# Patient Record
Sex: MALE | Race: WHITE | Employment: OTHER | URBAN - NONMETROPOLITAN AREA
[De-identification: names, ages, dates, MRNs, and addresses within clinical notes are randomized per-mention and may not be internally consistent; named-entity substitution may affect disease eponyms.]

---

## 2017-01-01 ENCOUNTER — OFFICE VISIT (OUTPATIENT)
Dept: FAMILY MEDICINE CLINIC | Age: 77
End: 2017-01-01
Payer: MEDICARE

## 2017-01-01 VITALS
SYSTOLIC BLOOD PRESSURE: 122 MMHG | HEART RATE: 60 BPM | OXYGEN SATURATION: 97 % | WEIGHT: 171 LBS | BODY MASS INDEX: 33.57 KG/M2 | DIASTOLIC BLOOD PRESSURE: 62 MMHG | HEIGHT: 60 IN | RESPIRATION RATE: 14 BRPM | TEMPERATURE: 97.4 F

## 2017-01-01 DIAGNOSIS — R60.0 BILATERAL LOWER EXTREMITY EDEMA: Primary | ICD-10-CM

## 2017-01-01 DIAGNOSIS — I50.9 DYSPNEA DUE TO CONGESTIVE HEART FAILURE (HCC): ICD-10-CM

## 2017-01-01 PROCEDURE — G0444 DEPRESSION SCREEN ANNUAL: HCPCS | Performed by: NURSE PRACTITIONER

## 2017-01-01 PROCEDURE — 99213 OFFICE O/P EST LOW 20 MIN: CPT | Performed by: NURSE PRACTITIONER

## 2017-01-01 ASSESSMENT — PATIENT HEALTH QUESTIONNAIRE - PHQ9
SUM OF ALL RESPONSES TO PHQ QUESTIONS 1-9: 11
5. POOR APPETITE OR OVEREATING: 2
7. TROUBLE CONCENTRATING ON THINGS, SUCH AS READING THE NEWSPAPER OR WATCHING TELEVISION: 0
8. MOVING OR SPEAKING SO SLOWLY THAT OTHER PEOPLE COULD HAVE NOTICED. OR THE OPPOSITE, BEING SO FIGETY OR RESTLESS THAT YOU HAVE BEEN MOVING AROUND A LOT MORE THAN USUAL: 0
9. THOUGHTS THAT YOU WOULD BE BETTER OFF DEAD, OR OF HURTING YOURSELF: 0
3. TROUBLE FALLING OR STAYING ASLEEP: 0
2. FEELING DOWN, DEPRESSED OR HOPELESS: 3
1. LITTLE INTEREST OR PLEASURE IN DOING THINGS: 3
10. IF YOU CHECKED OFF ANY PROBLEMS, HOW DIFFICULT HAVE THESE PROBLEMS MADE IT FOR YOU TO DO YOUR WORK, TAKE CARE OF THINGS AT HOME, OR GET ALONG WITH OTHER PEOPLE: 2
4. FEELING TIRED OR HAVING LITTLE ENERGY: 3
6. FEELING BAD ABOUT YOURSELF - OR THAT YOU ARE A FAILURE OR HAVE LET YOURSELF OR YOUR FAMILY DOWN: 0
SUM OF ALL RESPONSES TO PHQ9 QUESTIONS 1 & 2: 6

## 2017-01-01 ASSESSMENT — ENCOUNTER SYMPTOMS
TROUBLE SWALLOWING: 0
CHEST TIGHTNESS: 0
SORE THROAT: 0
COLOR CHANGE: 0
COUGH: 0
SHORTNESS OF BREATH: 1
WHEEZING: 0
RHINORRHEA: 0

## 2017-02-24 DIAGNOSIS — C61 MALIGNANT NEOPLASM OF PROSTATE (HCC): Primary | ICD-10-CM

## 2017-02-24 LAB — PROSTATE SPECIFIC ANTIGEN: 5.63 NG/ML

## 2017-02-27 ENCOUNTER — OFFICE VISIT (OUTPATIENT)
Dept: UROLOGY | Age: 77
End: 2017-02-27

## 2017-02-27 VITALS
HEIGHT: 64 IN | DIASTOLIC BLOOD PRESSURE: 58 MMHG | WEIGHT: 162 LBS | BODY MASS INDEX: 27.66 KG/M2 | SYSTOLIC BLOOD PRESSURE: 122 MMHG

## 2017-02-27 DIAGNOSIS — C61 PROSTATE CANCER (HCC): Primary | ICD-10-CM

## 2017-02-27 LAB
BILIRUBIN URINE: NEGATIVE
BLOOD URINE, POC: NEGATIVE
CHARACTER, URINE: CLEAR
COLOR, URINE: YELLOW
GLUCOSE URINE: NEGATIVE MG/DL
KETONES, URINE: NEGATIVE
LEUKOCYTE CLUMPS, URINE: NEGATIVE
NITRITE, URINE: NEGATIVE
PH, URINE: 5.5
PROTEIN, URINE: NEGATIVE MG/DL
SPECIFIC GRAVITY, URINE: 1.01 (ref 1–1.03)
UROBILINOGEN, URINE: 0.2 EU/DL

## 2017-02-27 PROCEDURE — 99214 OFFICE O/P EST MOD 30 MIN: CPT | Performed by: UROLOGY

## 2017-02-27 PROCEDURE — G8484 FLU IMMUNIZE NO ADMIN: HCPCS | Performed by: UROLOGY

## 2017-02-27 PROCEDURE — G8598 ASA/ANTIPLAT THER USED: HCPCS | Performed by: UROLOGY

## 2017-02-27 PROCEDURE — G8427 DOCREV CUR MEDS BY ELIG CLIN: HCPCS | Performed by: UROLOGY

## 2017-02-27 PROCEDURE — 1123F ACP DISCUSS/DSCN MKR DOCD: CPT | Performed by: UROLOGY

## 2017-02-27 PROCEDURE — 96402 CHEMO HORMON ANTINEOPL SQ/IM: CPT | Performed by: UROLOGY

## 2017-02-27 PROCEDURE — 1036F TOBACCO NON-USER: CPT | Performed by: UROLOGY

## 2017-02-27 PROCEDURE — 81003 URINALYSIS AUTO W/O SCOPE: CPT | Performed by: UROLOGY

## 2017-02-27 PROCEDURE — 4040F PNEUMOC VAC/ADMIN/RCVD: CPT | Performed by: UROLOGY

## 2017-02-27 PROCEDURE — G8420 CALC BMI NORM PARAMETERS: HCPCS | Performed by: UROLOGY

## 2017-02-27 RX ORDER — SPIRONOLACTONE 25 MG/1
12.5 TABLET ORAL DAILY
COMMUNITY

## 2017-02-27 RX ORDER — FOLIC ACID 1 MG/1
1 TABLET ORAL DAILY
COMMUNITY
End: 2018-01-01 | Stop reason: ALTCHOICE

## 2017-02-27 ASSESSMENT — ENCOUNTER SYMPTOMS
FACIAL SWELLING: 0
CONSTIPATION: 0
SHORTNESS OF BREATH: 0
EYE REDNESS: 0
BACK PAIN: 1
EYE PAIN: 0
VOMITING: 0
CHEST TIGHTNESS: 0
COLOR CHANGE: 0

## 2017-04-06 DIAGNOSIS — C61 PROSTATE CANCER (HCC): ICD-10-CM

## 2017-04-28 LAB — PROSTATE SPECIFIC ANTIGEN: 8.1 NG/ML

## 2017-05-30 LAB — PROSTATE SPECIFIC ANTIGEN: 12.78 NG/ML

## 2017-06-22 ENCOUNTER — OFFICE VISIT (OUTPATIENT)
Dept: UROLOGY | Age: 77
End: 2017-06-22

## 2017-06-22 VITALS — HEIGHT: 65 IN | WEIGHT: 172 LBS | BODY MASS INDEX: 28.66 KG/M2

## 2017-06-22 DIAGNOSIS — C61 PROSTATE CANCER (HCC): Primary | ICD-10-CM

## 2017-06-22 DIAGNOSIS — R97.20 ELEVATED PSA: ICD-10-CM

## 2017-06-22 LAB
BILIRUBIN URINE: NORMAL
BLOOD URINE, POC: NORMAL
CHARACTER, URINE: CLEAR
COLOR, URINE: YELLOW
GLUCOSE URINE: NEGATIVE MG/DL
KETONES, URINE: NEGATIVE
LEUKOCYTE CLUMPS, URINE: NEGATIVE
NITRITE, URINE: NEGATIVE
PH, URINE: 5.5
PROTEIN, URINE: NEGATIVE MG/DL
SPECIFIC GRAVITY, URINE: 1.02 (ref 1–1.03)
UROBILINOGEN, URINE: 0.2 EU/DL

## 2017-06-22 PROCEDURE — 1123F ACP DISCUSS/DSCN MKR DOCD: CPT | Performed by: UROLOGY

## 2017-06-22 PROCEDURE — G8419 CALC BMI OUT NRM PARAM NOF/U: HCPCS | Performed by: UROLOGY

## 2017-06-22 PROCEDURE — 1036F TOBACCO NON-USER: CPT | Performed by: UROLOGY

## 2017-06-22 PROCEDURE — 81003 URINALYSIS AUTO W/O SCOPE: CPT | Performed by: UROLOGY

## 2017-06-22 PROCEDURE — 96402 CHEMO HORMON ANTINEOPL SQ/IM: CPT | Performed by: UROLOGY

## 2017-06-22 PROCEDURE — G8598 ASA/ANTIPLAT THER USED: HCPCS | Performed by: UROLOGY

## 2017-06-22 PROCEDURE — G8427 DOCREV CUR MEDS BY ELIG CLIN: HCPCS | Performed by: UROLOGY

## 2017-06-22 PROCEDURE — 4040F PNEUMOC VAC/ADMIN/RCVD: CPT | Performed by: UROLOGY

## 2017-06-22 PROCEDURE — 99215 OFFICE O/P EST HI 40 MIN: CPT | Performed by: UROLOGY

## 2017-06-22 RX ORDER — BICALUTAMIDE 50 MG/1
50 TABLET, FILM COATED ORAL DAILY
Qty: 90 TABLET | Refills: 2 | Status: SHIPPED | OUTPATIENT
Start: 2017-06-22 | End: 2018-01-01 | Stop reason: ALTCHOICE

## 2017-06-22 ASSESSMENT — ENCOUNTER SYMPTOMS
NAUSEA: 0
CHEST TIGHTNESS: 0
ABDOMINAL PAIN: 0
EYE REDNESS: 0
SHORTNESS OF BREATH: 0
COLOR CHANGE: 0
EYE PAIN: 0
BACK PAIN: 1

## 2017-07-05 ENCOUNTER — TELEPHONE (OUTPATIENT)
Dept: UROLOGY | Age: 77
End: 2017-07-05

## 2017-07-31 LAB
INR BLD: 1.1
PROTIME: 10.9 SECONDS

## 2017-08-30 LAB — PROSTATE SPECIFIC ANTIGEN: 4.79 NG/ML

## 2017-09-05 DIAGNOSIS — C61 PROSTATE CANCER (HCC): ICD-10-CM

## 2017-09-28 ENCOUNTER — OFFICE VISIT (OUTPATIENT)
Dept: UROLOGY | Age: 77
End: 2017-09-28
Payer: MEDICARE

## 2017-09-28 VITALS
DIASTOLIC BLOOD PRESSURE: 72 MMHG | SYSTOLIC BLOOD PRESSURE: 130 MMHG | HEIGHT: 65 IN | WEIGHT: 168 LBS | BODY MASS INDEX: 27.99 KG/M2

## 2017-09-28 DIAGNOSIS — C61 PROSTATE CANCER (HCC): Primary | ICD-10-CM

## 2017-09-28 LAB
BILIRUBIN URINE: NEGATIVE
BLOOD URINE, POC: NEGATIVE
CHARACTER, URINE: CLEAR
COLOR, URINE: YELLOW
GLUCOSE URINE: NEGATIVE MG/DL
KETONES, URINE: NEGATIVE
LEUKOCYTE CLUMPS, URINE: NEGATIVE
NITRITE, URINE: NEGATIVE
PH, URINE: 7
PROTEIN, URINE: NEGATIVE MG/DL
SPECIFIC GRAVITY, URINE: 1.01 (ref 1–1.03)
UROBILINOGEN, URINE: 0.2 EU/DL

## 2017-09-28 PROCEDURE — G8428 CUR MEDS NOT DOCUMENT: HCPCS | Performed by: UROLOGY

## 2017-09-28 PROCEDURE — 4040F PNEUMOC VAC/ADMIN/RCVD: CPT | Performed by: UROLOGY

## 2017-09-28 PROCEDURE — 1036F TOBACCO NON-USER: CPT | Performed by: UROLOGY

## 2017-09-28 PROCEDURE — G8417 CALC BMI ABV UP PARAM F/U: HCPCS | Performed by: UROLOGY

## 2017-09-28 PROCEDURE — 99215 OFFICE O/P EST HI 40 MIN: CPT | Performed by: UROLOGY

## 2017-09-28 PROCEDURE — 96402 CHEMO HORMON ANTINEOPL SQ/IM: CPT | Performed by: UROLOGY

## 2017-09-28 PROCEDURE — G8598 ASA/ANTIPLAT THER USED: HCPCS | Performed by: UROLOGY

## 2017-09-28 PROCEDURE — 81003 URINALYSIS AUTO W/O SCOPE: CPT | Performed by: UROLOGY

## 2017-09-28 PROCEDURE — 1123F ACP DISCUSS/DSCN MKR DOCD: CPT | Performed by: UROLOGY

## 2017-09-28 ASSESSMENT — ENCOUNTER SYMPTOMS
VOMITING: 0
CHEST TIGHTNESS: 0
SHORTNESS OF BREATH: 0
DIARRHEA: 0

## 2017-10-02 ENCOUNTER — TELEPHONE (OUTPATIENT)
Dept: UROLOGY | Age: 77
End: 2017-10-02

## 2017-10-03 NOTE — TELEPHONE ENCOUNTER
Patient was appreciative of the phone call back and stated that takes a load of his mind. He was really bothered by this. Thank you.

## 2017-12-13 NOTE — PATIENT INSTRUCTIONS
Patient Education      Go directly to ER. Heart Failure: Care Instructions  Your Care Instructions    Heart failure occurs when your heart does not pump as much blood as the body needs. Failure does not mean that the heart has stopped pumping but rather that it is not pumping as well as it should. Over time, this causes fluid buildup in your lungs and other parts of your body. Fluid buildup can cause shortness of breath, fatigue, swollen ankles, and other problems. By taking medicines regularly, reducing sodium (salt) in your diet, checking your weight every day, and making lifestyle changes, you can feel better and live longer. Follow-up care is a key part of your treatment and safety. Be sure to make and go to all appointments, and call your doctor if you are having problems. It's also a good idea to know your test results and keep a list of the medicines you take. How can you care for yourself at home? Medicines  ? · Be safe with medicines. Take your medicines exactly as prescribed. Call your doctor if you think you are having a problem with your medicine. ? · Do not take any vitamins, over-the-counter medicine, or herbal products without talking to your doctor first. Yanet Sanchezmb not take ibuprofen (Advil or Motrin) and naproxen (Aleve) without talking to your doctor first. They could make your heart failure worse. ? · You may be taking some of the following medicine. ¨ Beta-blockers can slow heart rate, decrease blood pressure, and improve your condition. Taking a beta-blocker may lower your chance of needing to be hospitalized. ¨ Angiotensin-converting enzyme inhibitors (ACEIs) reduce the heart's workload, lower blood pressure, and reduce swelling. Taking an ACEI may lower your chance of needing to be hospitalized again. ¨ Angiotensin II receptor blockers (ARBs) work like ACEIs. Your doctor may prescribe them instead of ACEIs. ¨ Diuretics, also called water pills, reduce swelling.   ¨ Potassium supplements replace this important mineral, which is sometimes lost with diuretics. ¨ Aspirin and other blood thinners prevent blood clots, which can cause a stroke or heart attack. ? You will get more details on the specific medicines your doctor prescribes. Diet  ? · Your doctor may suggest that you limit sodium to 2,000 milligrams (mg) a day or less. That is less than 1 teaspoon of salt a day, including all the salt you eat in cooking or in packaged foods. People get most of their sodium from processed foods. Fast food and restaurant meals also tend to be very high in sodium. ? · Ask your doctor how much liquid you can drink each day. You may have to limit liquids. ?Weight  ? · Weigh yourself without clothing at the same time each day. Record your weight. Call your doctor if you have a sudden weight gain, such as more than 2 to 3 pounds in a day or 5 pounds in a week. (Your doctor may suggest a different range of weight gain.) A sudden weight gain may mean that your heart failure is getting worse. ? Activity level  ? · Start light exercise (if your doctor says it is okay). Even if you can only do a small amount, exercise will help you get stronger, have more energy, and manage your weight and your stress. Walking is an easy way to get exercise. Start out by walking a little more than you did before. Bit by bit, increase the amount you walk. ? · When you exercise, watch for signs that your heart is working too hard. You are pushing yourself too hard if you cannot talk while you are exercising. If you become short of breath or dizzy or have chest pain, stop, sit down, and rest.   ? · If you feel \"wiped out\" the day after you exercise, walk slower or for a shorter distance until you can work up to a better pace. ? · Get enough rest at night. Sleeping with 1 or 2 pillows under your upper body and head may help you breathe easier. ? Lifestyle changes  ? · Do not smoke. Smoking can make a heart condition worse.  If you

## 2017-12-13 NOTE — PROGRESS NOTES
in the right upper field and the left upper field. He has no wheezes. He has no rhonchi. He has no rales. Musculoskeletal:        Right lower leg: He exhibits edema (2+, pitting). Left lower leg: He exhibits edema (2+, pitting. Slight erythema anterior left lower leg.). Lymphadenopathy:        Head (right side): No submental, no submandibular, no tonsillar, no preauricular, no posterior auricular and no occipital adenopathy present. Head (left side): No submental, no submandibular, no tonsillar, no preauricular, no posterior auricular and no occipital adenopathy present. He has no cervical adenopathy. Neurological: He is alert and oriented to person, place, and time. He is not disoriented. Skin: Skin is warm, dry and intact. No rash noted. He is not diaphoretic. There is erythema (minimal, left anterior lower leg). No pallor. Skin intact, warm and dry to touch, no rashes noted. Nursing note and vitals reviewed. DIAGNOSTIC RESULTS   Labs:No results found for this visit on 12/13/17. IMAGING:  No orders to display     CLINICAL COURSE COURSE:     Vitals:    12/13/17 1624   BP: 122/62   Pulse: 60   Resp: 14   Temp: 97.4 °F (36.3 °C)   SpO2: 97%   Weight: 171 lb (77.6 kg)   Height: 5' 0.24\" (1.53 m)         PROCEDURES:  None  FINAL IMPRESSION      1. Bilateral lower extremity edema    2. Dyspnea due to congestive heart failure (HCC)         DISPOSITION/PLAN     Non-toxic, no acute distress. Slightly diminished lung sounds bilateral upper lobes. No adventitious sounds. Bilateral lower extremity pitting edema, 2+. Denies chest pain. Patient going by private car by his daughter to [facility] ER in stable condition. Advised to go directly to ER. Report called to Dr. Elieser Cortez. Discharge instructions given by staff. PATIENT REFERRED TO:    Patient going directly to [facility] ER.     DISCHARGE MEDICATIONS:  New Prescriptions    No medications on file         Electronically signed by Melissa Valdez

## 2018-01-01 ENCOUNTER — TELEPHONE (OUTPATIENT)
Dept: UROLOGY | Age: 78
End: 2018-01-01

## 2018-01-01 ENCOUNTER — OFFICE VISIT (OUTPATIENT)
Dept: UROLOGY | Age: 78
End: 2018-01-01
Payer: MEDICARE

## 2018-01-01 ENCOUNTER — HOSPITAL ENCOUNTER (INPATIENT)
Age: 78
LOS: 5 days | Discharge: HOSPICE | DRG: 947 | End: 2018-10-30
Attending: HOSPITALIST | Admitting: HOSPITALIST
Payer: MEDICARE

## 2018-01-01 ENCOUNTER — HOSPITAL ENCOUNTER (OUTPATIENT)
Dept: NUCLEAR MEDICINE | Age: 78
Discharge: HOME | End: 2018-09-17
Payer: MEDICARE

## 2018-01-01 ENCOUNTER — OFFICE VISIT (OUTPATIENT)
Dept: ONCOLOGY | Age: 78
End: 2018-01-01
Payer: MEDICARE

## 2018-01-01 ENCOUNTER — APPOINTMENT (OUTPATIENT)
Dept: GENERAL RADIOLOGY | Age: 78
DRG: 947 | End: 2018-01-01
Payer: MEDICARE

## 2018-01-01 ENCOUNTER — CLINICAL DOCUMENTATION (OUTPATIENT)
Dept: NUTRITION | Age: 78
End: 2018-01-01

## 2018-01-01 ENCOUNTER — NURSE ONLY (OUTPATIENT)
Dept: UROLOGY | Age: 78
End: 2018-01-01
Payer: MEDICARE

## 2018-01-01 ENCOUNTER — HOSPITAL ENCOUNTER (OUTPATIENT)
Dept: RADIATION ONCOLOGY | Age: 78
Discharge: HOME | End: 2018-10-03
Payer: MEDICARE

## 2018-01-01 ENCOUNTER — HOSPITAL ENCOUNTER (OUTPATIENT)
Age: 78
Discharge: HOME | End: 2018-04-13
Payer: MEDICARE

## 2018-01-01 ENCOUNTER — HOSPITAL ENCOUNTER (OUTPATIENT)
Dept: GENERAL RADIOLOGY | Age: 78
Discharge: HOME | End: 2018-04-19
Payer: MEDICARE

## 2018-01-01 ENCOUNTER — HOSPITAL ENCOUNTER (OUTPATIENT)
Dept: CT IMAGING | Age: 78
Discharge: HOME | End: 2018-10-04
Payer: MEDICARE

## 2018-01-01 ENCOUNTER — APPOINTMENT (OUTPATIENT)
Dept: CT IMAGING | Age: 78
DRG: 947 | End: 2018-01-01
Payer: MEDICARE

## 2018-01-01 ENCOUNTER — TELEPHONE (OUTPATIENT)
Dept: INFUSION THERAPY | Age: 78
End: 2018-01-01

## 2018-01-01 ENCOUNTER — NURSE ONLY (OUTPATIENT)
Dept: UROLOGY | Age: 78
End: 2018-01-01

## 2018-01-01 ENCOUNTER — TELEPHONE (OUTPATIENT)
Dept: ONCOLOGY | Age: 78
End: 2018-01-01

## 2018-01-01 ENCOUNTER — HOSPITAL ENCOUNTER (OUTPATIENT)
Dept: CT IMAGING | Age: 78
Discharge: HOME | End: 2018-04-19
Payer: MEDICARE

## 2018-01-01 VITALS
HEIGHT: 63 IN | SYSTOLIC BLOOD PRESSURE: 120 MMHG | WEIGHT: 159 LBS | BODY MASS INDEX: 28.17 KG/M2 | DIASTOLIC BLOOD PRESSURE: 80 MMHG

## 2018-01-01 VITALS
WEIGHT: 150 LBS | BODY MASS INDEX: 26.58 KG/M2 | SYSTOLIC BLOOD PRESSURE: 76 MMHG | DIASTOLIC BLOOD PRESSURE: 42 MMHG | HEIGHT: 63 IN

## 2018-01-01 VITALS
DIASTOLIC BLOOD PRESSURE: 66 MMHG | WEIGHT: 171 LBS | BODY MASS INDEX: 33.57 KG/M2 | SYSTOLIC BLOOD PRESSURE: 126 MMHG | HEIGHT: 60 IN

## 2018-01-01 VITALS
WEIGHT: 150.7 LBS | HEIGHT: 63 IN | RESPIRATION RATE: 18 BRPM | OXYGEN SATURATION: 90 % | SYSTOLIC BLOOD PRESSURE: 114 MMHG | HEART RATE: 70 BPM | TEMPERATURE: 98.6 F | DIASTOLIC BLOOD PRESSURE: 64 MMHG | BODY MASS INDEX: 26.7 KG/M2

## 2018-01-01 VITALS
SYSTOLIC BLOOD PRESSURE: 120 MMHG | BODY MASS INDEX: 31.06 KG/M2 | HEIGHT: 62 IN | WEIGHT: 168.8 LBS | DIASTOLIC BLOOD PRESSURE: 64 MMHG

## 2018-01-01 VITALS
WEIGHT: 156.8 LBS | DIASTOLIC BLOOD PRESSURE: 66 MMHG | BODY MASS INDEX: 27.78 KG/M2 | TEMPERATURE: 97.6 F | HEIGHT: 63 IN | RESPIRATION RATE: 18 BRPM | HEART RATE: 66 BPM | SYSTOLIC BLOOD PRESSURE: 143 MMHG | OXYGEN SATURATION: 93 %

## 2018-01-01 DIAGNOSIS — G89.3 CANCER RELATED PAIN: ICD-10-CM

## 2018-01-01 DIAGNOSIS — C61 PROSTATE CANCER METASTATIC TO BONE (HCC): ICD-10-CM

## 2018-01-01 DIAGNOSIS — C79.51 BONE METASTASIS (HCC): ICD-10-CM

## 2018-01-01 DIAGNOSIS — R33.9 INCOMPLETE EMPTYING OF BLADDER: ICD-10-CM

## 2018-01-01 DIAGNOSIS — I50.9 ACUTE ON CHRONIC CONGESTIVE HEART FAILURE, UNSPECIFIED HEART FAILURE TYPE (HCC): Primary | ICD-10-CM

## 2018-01-01 DIAGNOSIS — C61 PROSTATE CANCER (HCC): Primary | ICD-10-CM

## 2018-01-01 DIAGNOSIS — C61 PROSTATE CANCER METASTATIC TO BONE (HCC): Primary | ICD-10-CM

## 2018-01-01 DIAGNOSIS — C61 PROSTATE CANCER (HCC): ICD-10-CM

## 2018-01-01 DIAGNOSIS — C79.51 SECONDARY MALIGNANT NEOPLASM OF BONE AND BONE MARROW (HCC): ICD-10-CM

## 2018-01-01 DIAGNOSIS — Z00.6 EXAMINATION FOR NORMAL COMPARISON FOR CLINICAL RESEARCH: ICD-10-CM

## 2018-01-01 DIAGNOSIS — C79.51 PROSTATE CANCER METASTATIC TO BONE (HCC): ICD-10-CM

## 2018-01-01 DIAGNOSIS — C79.51 SECONDARY MALIGNANT NEOPLASM OF BONE AND BONE MARROW (HCC): Primary | ICD-10-CM

## 2018-01-01 DIAGNOSIS — C61 MALIGNANT NEOPLASM OF PROSTATE (HCC): Primary | ICD-10-CM

## 2018-01-01 DIAGNOSIS — Z79.818 ANDROGEN DEPRIVATION THERAPY: ICD-10-CM

## 2018-01-01 DIAGNOSIS — C79.52 SECONDARY MALIGNANT NEOPLASM OF BONE AND BONE MARROW (HCC): ICD-10-CM

## 2018-01-01 DIAGNOSIS — R33.9 URINARY RETENTION: ICD-10-CM

## 2018-01-01 DIAGNOSIS — C61 PROSTATE CANCER METASTATIC TO MULTIPLE SITES (HCC): Primary | ICD-10-CM

## 2018-01-01 DIAGNOSIS — C79.51 BONE METASTASES (HCC): ICD-10-CM

## 2018-01-01 DIAGNOSIS — C79.52 SECONDARY MALIGNANT NEOPLASM OF BONE AND BONE MARROW (HCC): Primary | ICD-10-CM

## 2018-01-01 DIAGNOSIS — C79.51 PROSTATE CANCER METASTATIC TO BONE (HCC): Primary | ICD-10-CM

## 2018-01-01 LAB
ADENOVIRUS F 40 41 PCR: NOT DETECTED
ALBUMIN SERPL-MCNC: 2.8 G/DL (ref 3.5–5.1)
ALP BLD-CCNC: 116 U/L (ref 38–126)
ALT SERPL-CCNC: 8 U/L (ref 11–66)
ANION GAP SERPL CALCULATED.3IONS-SCNC: 13 MEQ/L (ref 8–16)
ANION GAP SERPL CALCULATED.3IONS-SCNC: 13 MEQ/L (ref 8–16)
ANION GAP SERPL CALCULATED.3IONS-SCNC: 14 MEQ/L (ref 8–16)
ANION GAP SERPL CALCULATED.3IONS-SCNC: 15 MEQ/L (ref 8–16)
ANION GAP SERPL CALCULATED.3IONS-SCNC: 16 MEQ/L (ref 8–16)
AST SERPL-CCNC: 12 U/L (ref 5–40)
ASTROVIRUS PCR: NOT DETECTED
BASOPHILS # BLD: 0.2 %
BASOPHILS ABSOLUTE: 0 THOU/MM3 (ref 0–0.1)
BASOPHILS ABSOLUTE: 0.1 /ΜL
BASOPHILS RELATIVE PERCENT: 0.9 %
BILIRUB SERPL-MCNC: 0.7 MG/DL (ref 0.3–1.2)
BILIRUBIN DIRECT: < 0.2 MG/DL (ref 0–0.3)
BILIRUBIN URINE: NEGATIVE
BILIRUBIN URINE: NORMAL MG/DL
BLOOD URINE, POC: ABNORMAL
BLOOD URINE, POC: NEGATIVE
BLOOD URINE, POC: NORMAL
BLOOD, URINE: NEGATIVE
BUN BLDV-MCNC: 18 MG/DL (ref 7–22)
BUN BLDV-MCNC: 19 MG/DL (ref 7–22)
BUN BLDV-MCNC: 20 MG/DL (ref 7–22)
BUN BLDV-MCNC: 20 MG/DL (ref 7–22)
BUN BLDV-MCNC: 24 MG/DL (ref 7–22)
BUN BLDV-MCNC: 25 MG/DL
C-REACTIVE PROTEIN: 1.52 MG/DL (ref 0–1)
CALCIUM IONIZED: 0.78 MMOL/L (ref 1.12–1.32)
CALCIUM IONIZED: 0.81 MMOL/L (ref 1.12–1.32)
CALCIUM IONIZED: 0.82 MMOL/L (ref 1.12–1.32)
CALCIUM IONIZED: 0.89 MMOL/L (ref 1.12–1.32)
CALCIUM SERPL-MCNC: 6.2 MG/DL (ref 8.5–10.5)
CALCIUM SERPL-MCNC: 6.5 MG/DL (ref 8.5–10.5)
CALCIUM SERPL-MCNC: 6.5 MG/DL (ref 8.5–10.5)
CALCIUM SERPL-MCNC: 6.9 MG/DL (ref 8.5–10.5)
CALCIUM SERPL-MCNC: 7.2 MG/DL (ref 8.5–10.5)
CALCIUM SERPL-MCNC: 8.6 MG/DL
CAMPYLOBACTER PCR: NOT DETECTED
CHARACTER, URINE: CLEAR
CHLORIDE BLD-SCNC: 101 MEQ/L (ref 98–111)
CHLORIDE BLD-SCNC: 102 MEQ/L (ref 98–111)
CHLORIDE BLD-SCNC: 102 MMOL/L
CHLORIDE BLD-SCNC: 99 MEQ/L (ref 98–111)
CLARITY: NORMAL
CLOSTRIDIUM DIFFICILE, PCR: NOT DETECTED
CO2: 19 MEQ/L (ref 23–33)
CO2: 20 MEQ/L (ref 23–33)
CO2: 20 MEQ/L (ref 23–33)
CO2: 22 MEQ/L (ref 23–33)
CO2: 23 MEQ/L (ref 23–33)
CO2: 26 MMOL/L
COLOR, URINE: YELLOW
COLOR: NORMAL
CREAT SERPL-MCNC: 0.6 MG/DL (ref 0.4–1.2)
CREAT SERPL-MCNC: 0.7 MG/DL (ref 0.4–1.2)
CREAT SERPL-MCNC: 0.9 MG/DL (ref 0.4–1.2)
CREAT SERPL-MCNC: 1 MG/DL
CRYPTOSPORIDIUM PCR: NOT DETECTED
CYCLOSPORA CAYETANENSIS PCR: NOT DETECTED
E COLI 0157 PCR: NORMAL
E COLI ENTEROAGGREGATIVE PCR: NOT DETECTED
E COLI ENTEROPATHOGENIC PCR: NOT DETECTED
E COLI ENTEROTOXIGENIC PCR: NOT DETECTED
E COLI SHIGA LIKE TOXIN PCR: NOT DETECTED
E COLI SHIGELLA/ENTEROINVASIVE PCR: NOT DETECTED
E HISTOLYTICA GI FILM ARRAY: NOT DETECTED
EKG ATRIAL RATE: 75 BPM
EKG Q-T INTERVAL: 478 MS
EKG QRS DURATION: 116 MS
EKG QTC CALCULATION (BAZETT): 516 MS
EKG R AXIS: -46 DEGREES
EKG T AXIS: 124 DEGREES
EKG VENTRICULAR RATE: 70 BPM
EOSINOPHIL # BLD: 0.6 %
EOSINOPHILS ABSOLUTE: 0 /ΜL
EOSINOPHILS ABSOLUTE: 0 THOU/MM3 (ref 0–0.4)
EOSINOPHILS RELATIVE PERCENT: 0.1 %
ERYTHROCYTE [DISTWIDTH] IN BLOOD BY AUTOMATED COUNT: 15.6 % (ref 11.5–14.5)
ERYTHROCYTE [DISTWIDTH] IN BLOOD BY AUTOMATED COUNT: 57.4 FL (ref 35–45)
GFR CALCULATED: NORMAL
GFR SERPL CREATININE-BSD FRML MDRD: 81 ML/MIN/1.73M2
GFR SERPL CREATININE-BSD FRML MDRD: > 90 ML/MIN/1.73M2
GIARDIA LAMBLIA PCR: NOT DETECTED
GLUCOSE BLD-MCNC: 102 MG/DL (ref 70–108)
GLUCOSE BLD-MCNC: 109 MG/DL (ref 70–108)
GLUCOSE BLD-MCNC: 110 MG/DL (ref 70–108)
GLUCOSE BLD-MCNC: 124 MG/DL (ref 70–108)
GLUCOSE BLD-MCNC: 131 MG/DL
GLUCOSE BLD-MCNC: 99 MG/DL (ref 70–108)
GLUCOSE URINE: NEGATIVE
GLUCOSE URINE: NEGATIVE MG/DL
HCT VFR BLD CALC: 28.5 % (ref 41–53)
HCT VFR BLD CALC: 39.9 % (ref 42–52)
HEMOGLOBIN: 13.2 GM/DL (ref 14–18)
HEMOGLOBIN: 9.7 G/DL (ref 13.5–17.5)
IMMATURE GRANS (ABS): 0.04 THOU/MM3 (ref 0–0.07)
IMMATURE GRANULOCYTES: 0.8 %
KETONES, URINE: NEGATIVE
KETONES, URINE: NORMAL
LACTIC ACID, SEPSIS: 1.6 MMOL/L (ref 0.5–1.9)
LEUKOCYTE CLUMPS, URINE: NEGATIVE
LEUKOCYTE ESTERASE, URINE: NEGATIVE
LIPASE: 7.6 U/L (ref 5.6–51.3)
LYMPHOCYTES # BLD: 4.3 %
LYMPHOCYTES ABSOLUTE: 0.2 /ΜL
LYMPHOCYTES ABSOLUTE: 0.2 THOU/MM3 (ref 1–4.8)
LYMPHOCYTES RELATIVE PERCENT: 3.1 %
MAGNESIUM: 1.8 MG/DL (ref 1.6–2.4)
MAGNESIUM: 1.9 MG/DL (ref 1.6–2.4)
MAGNESIUM: 1.9 MG/DL (ref 1.6–2.4)
MAGNESIUM: 2 MG/DL (ref 1.6–2.4)
MCH RBC QN AUTO: 30.9 PG
MCH RBC QN AUTO: 33.1 PG (ref 26–33)
MCHC RBC AUTO-ENTMCNC: 33.1 GM/DL (ref 32.2–35.5)
MCHC RBC AUTO-ENTMCNC: 33.9 G/DL
MCV RBC AUTO: 100 FL (ref 80–94)
MCV RBC AUTO: 91.2 FL
MONOCYTES # BLD: 8.6 %
MONOCYTES ABSOLUTE: 0.4 THOU/MM3 (ref 0.4–1.3)
MONOCYTES ABSOLUTE: 0.8 /ΜL
MONOCYTES RELATIVE PERCENT: 10.9 %
NEUTROPHILS ABSOLUTE: 5.9 /ΜL
NEUTROPHILS RELATIVE PERCENT: 85 %
NITRITE, URINE: NEGATIVE
NOROVIRUS GI GII PCR: NOT DETECTED
NUCLEATED RED BLOOD CELLS: 0 /100 WBC
OSMOLALITY CALCULATION: 274.9 MOSMOL/KG (ref 275–300)
PDW BLD-RTO: 17.2 %
PH UA: 5 (ref 4.5–8)
PH, URINE: 5.5
PH, URINE: 5.5
PH, URINE: 7
PHOSPHORUS: 2.5 MG/DL (ref 2.4–4.7)
PLATELET # BLD: 210 K/ΜL
PLATELET # BLD: 213 THOU/MM3 (ref 130–400)
PLESIOMONAS SHIGELLOIDES PCR: NOT DETECTED
PMV BLD AUTO: 10.7 FL (ref 9.4–12.4)
PMV BLD AUTO: 7.9 FL
POST VOID RESIDUAL (PVR): 12 ML
POTASSIUM REFLEX MAGNESIUM: 3.8 MEQ/L (ref 3.5–5.2)
POTASSIUM SERPL-SCNC: 3.3 MEQ/L (ref 3.5–5.2)
POTASSIUM SERPL-SCNC: 4.1 MEQ/L (ref 3.5–5.2)
POTASSIUM SERPL-SCNC: 4.4 MMOL/L
POTASSIUM SERPL-SCNC: 4.6 MEQ/L (ref 3.5–5.2)
POTASSIUM SERPL-SCNC: 4.6 MEQ/L (ref 3.5–5.2)
PRO-BNP: 2876 PG/ML (ref 0–1800)
PRO-BNP: 3087 PG/ML (ref 0–1800)
PROSTATE SPECIFIC ANTIGEN: 139.95 NG/ML
PROSTATE SPECIFIC ANTIGEN: 18.57 NG/ML
PROSTATE SPECIFIC ANTIGEN: 251.13 NG/ML
PROSTATE SPECIFIC ANTIGEN: 288.45 NG/ML
PROSTATE SPECIFIC ANTIGEN: 439.37 NG/ML
PROSTATE SPECIFIC ANTIGEN: 620.17 NG/ML
PROSTATE SPECIFIC ANTIGEN: 99.66 NG/ML (ref 0–1)
PROTEIN UA: NORMAL
PROTEIN, URINE: 30 MG/DL
PROTEIN, URINE: NEGATIVE MG/DL
PROTEIN, URINE: NEGATIVE MG/DL
RBC # BLD: 3.12 10^6/ΜL
RBC # BLD: 3.99 MILL/MM3 (ref 4.7–6.1)
ROTAVIRUS A PCR: NOT DETECTED
SALMONELLA PCR: NOT DETECTED
SAPOVIRUS PCR: NOT DETECTED
SEG NEUTROPHILS: 85.5 %
SEGMENTED NEUTROPHILS ABSOLUTE COUNT: 4.4 THOU/MM3 (ref 1.8–7.7)
SODIUM BLD-SCNC: 132 MEQ/L (ref 135–145)
SODIUM BLD-SCNC: 133 MEQ/L (ref 135–145)
SODIUM BLD-SCNC: 136 MEQ/L (ref 135–145)
SODIUM BLD-SCNC: 136 MMOL/L
SODIUM BLD-SCNC: 137 MEQ/L (ref 135–145)
SODIUM BLD-SCNC: 137 MEQ/L (ref 135–145)
SPECIFIC GRAVITY UA: 1.02 (ref 1–1.03)
SPECIFIC GRAVITY, URINE: 1.01 (ref 1–1.03)
SPECIFIC GRAVITY, URINE: 1.01 (ref 1–1.03)
SPECIFIC GRAVITY, URINE: 1.02 (ref 1–1.03)
TESTOSTERONE TOTAL: 14
TOTAL PROTEIN: 5.4 G/DL (ref 6.1–8)
TROPONIN T: < 0.01 NG/ML
UROBILINOGEN, URINE: 0.2 EU/DL
UROBILINOGEN, URINE: 0.2 EU/DL
UROBILINOGEN, URINE: 2 EU/DL
UROBILINOGEN, URINE: NORMAL
VIBRIO CHOLERAE PCR: NOT DETECTED
VIBRIO PCR: NOT DETECTED
WBC # BLD: 5.1 THOU/MM3 (ref 4.8–10.8)
WBC # BLD: 6.9 10^3/ML
YERSINIA ENTEROCOLITICA PCR: NOT DETECTED

## 2018-01-01 PROCEDURE — 1200000003 HC TELEMETRY R&B

## 2018-01-01 PROCEDURE — 6370000000 HC RX 637 (ALT 250 FOR IP): Performed by: NURSE PRACTITIONER

## 2018-01-01 PROCEDURE — 80048 BASIC METABOLIC PNL TOTAL CA: CPT

## 2018-01-01 PROCEDURE — 6370000000 HC RX 637 (ALT 250 FOR IP): Performed by: STUDENT IN AN ORGANIZED HEALTH CARE EDUCATION/TRAINING PROGRAM

## 2018-01-01 PROCEDURE — 3209999900 XR COMPARISON OF OUTSIDE FILMS

## 2018-01-01 PROCEDURE — 83735 ASSAY OF MAGNESIUM: CPT

## 2018-01-01 PROCEDURE — 3209999900 CT GUIDE RADIATION THERAPY NO CHARGE

## 2018-01-01 PROCEDURE — G0378 HOSPITAL OBSERVATION PER HR: HCPCS

## 2018-01-01 PROCEDURE — 99226 PR SBSQ OBSERVATION CARE/DAY 35 MINUTES: CPT | Performed by: INTERNAL MEDICINE

## 2018-01-01 PROCEDURE — 77290 THER RAD SIMULAJ FIELD CPLX: CPT | Performed by: RADIOLOGY

## 2018-01-01 PROCEDURE — 6360000002 HC RX W HCPCS: Performed by: FAMILY MEDICINE

## 2018-01-01 PROCEDURE — 2709999900 HC NON-CHARGEABLE SUPPLY

## 2018-01-01 PROCEDURE — 2500000003 HC RX 250 WO HCPCS: Performed by: INTERNAL MEDICINE

## 2018-01-01 PROCEDURE — 77412 RADIATION TX DELIVERY LVL 3: CPT | Performed by: RADIOLOGY

## 2018-01-01 PROCEDURE — 77336 RADIATION PHYSICS CONSULT: CPT | Performed by: RADIOLOGY

## 2018-01-01 PROCEDURE — 6360000002 HC RX W HCPCS: Performed by: HOSPITALIST

## 2018-01-01 PROCEDURE — 77387 GUIDANCE FOR RADJ TX DLVR: CPT | Performed by: RADIOLOGY

## 2018-01-01 PROCEDURE — 6360000002 HC RX W HCPCS: Performed by: INTERNAL MEDICINE

## 2018-01-01 PROCEDURE — 36415 COLL VENOUS BLD VENIPUNCTURE: CPT

## 2018-01-01 PROCEDURE — 99232 SBSQ HOSP IP/OBS MODERATE 35: CPT | Performed by: NURSE PRACTITIONER

## 2018-01-01 PROCEDURE — 6360000002 HC RX W HCPCS: Performed by: PHYSICIAN ASSISTANT

## 2018-01-01 PROCEDURE — 2580000003 HC RX 258: Performed by: HOSPITALIST

## 2018-01-01 PROCEDURE — 83690 ASSAY OF LIPASE: CPT

## 2018-01-01 PROCEDURE — 82330 ASSAY OF CALCIUM: CPT

## 2018-01-01 PROCEDURE — 99233 SBSQ HOSP IP/OBS HIGH 50: CPT | Performed by: INTERNAL MEDICINE

## 2018-01-01 PROCEDURE — 6370000000 HC RX 637 (ALT 250 FOR IP): Performed by: INTERNAL MEDICINE

## 2018-01-01 PROCEDURE — 99238 HOSP IP/OBS DSCHRG MGMT 30/<: CPT | Performed by: INTERNAL MEDICINE

## 2018-01-01 PROCEDURE — G8987 SELF CARE CURRENT STATUS: HCPCS

## 2018-01-01 PROCEDURE — 99222 1ST HOSP IP/OBS MODERATE 55: CPT | Performed by: NURSE PRACTITIONER

## 2018-01-01 PROCEDURE — 99999 PR OFFICE/OUTPT VISIT,PROCEDURE ONLY: CPT | Performed by: UROLOGY

## 2018-01-01 PROCEDURE — 97161 PT EVAL LOW COMPLEX 20 MIN: CPT

## 2018-01-01 PROCEDURE — 83880 ASSAY OF NATRIURETIC PEPTIDE: CPT

## 2018-01-01 PROCEDURE — 77295 3-D RADIOTHERAPY PLAN: CPT | Performed by: RADIOLOGY

## 2018-01-01 PROCEDURE — 99212 OFFICE O/P EST SF 10 MIN: CPT

## 2018-01-01 PROCEDURE — 77334 RADIATION TREATMENT AID(S): CPT | Performed by: RADIOLOGY

## 2018-01-01 PROCEDURE — 99214 OFFICE O/P EST MOD 30 MIN: CPT | Performed by: UROLOGY

## 2018-01-01 PROCEDURE — 97535 SELF CARE MNGMENT TRAINING: CPT

## 2018-01-01 PROCEDURE — 51798 US URINE CAPACITY MEASURE: CPT

## 2018-01-01 PROCEDURE — 81003 URINALYSIS AUTO W/O SCOPE: CPT | Performed by: UROLOGY

## 2018-01-01 PROCEDURE — 74176 CT ABD & PELVIS W/O CONTRAST: CPT

## 2018-01-01 PROCEDURE — 99232 SBSQ HOSP IP/OBS MODERATE 35: CPT | Performed by: PAIN MEDICINE

## 2018-01-01 PROCEDURE — 77331 SPECIAL RADIATION DOSIMETRY: CPT | Performed by: RADIOLOGY

## 2018-01-01 PROCEDURE — 85025 COMPLETE CBC W/AUTO DIFF WBC: CPT

## 2018-01-01 PROCEDURE — 6370000000 HC RX 637 (ALT 250 FOR IP): Performed by: HOSPITALIST

## 2018-01-01 PROCEDURE — 6370000000 HC RX 637 (ALT 250 FOR IP): Performed by: PHYSICIAN ASSISTANT

## 2018-01-01 PROCEDURE — 99215 OFFICE O/P EST HI 40 MIN: CPT | Performed by: UROLOGY

## 2018-01-01 PROCEDURE — 2580000003 HC RX 258: Performed by: INTERNAL MEDICINE

## 2018-01-01 PROCEDURE — 86140 C-REACTIVE PROTEIN: CPT

## 2018-01-01 PROCEDURE — 6360000002 HC RX W HCPCS: Performed by: STUDENT IN AN ORGANIZED HEALTH CARE EDUCATION/TRAINING PROGRAM

## 2018-01-01 PROCEDURE — 97166 OT EVAL MOD COMPLEX 45 MIN: CPT

## 2018-01-01 PROCEDURE — 78306 BONE IMAGING WHOLE BODY: CPT

## 2018-01-01 PROCEDURE — 2580000003 HC RX 258: Performed by: FAMILY MEDICINE

## 2018-01-01 PROCEDURE — 84153 ASSAY OF PSA TOTAL: CPT

## 2018-01-01 PROCEDURE — 74018 RADEX ABDOMEN 1 VIEW: CPT

## 2018-01-01 PROCEDURE — A9503 TC99M MEDRONATE: HCPCS | Performed by: NURSE PRACTITIONER

## 2018-01-01 PROCEDURE — 99220 PR INITIAL OBSERVATION CARE/DAY 70 MINUTES: CPT | Performed by: HOSPITALIST

## 2018-01-01 PROCEDURE — 97110 THERAPEUTIC EXERCISES: CPT

## 2018-01-01 PROCEDURE — 71045 X-RAY EXAM CHEST 1 VIEW: CPT

## 2018-01-01 PROCEDURE — 96402 CHEMO HORMON ANTINEOPL SQ/IM: CPT | Performed by: UROLOGY

## 2018-01-01 PROCEDURE — 96376 TX/PRO/DX INJ SAME DRUG ADON: CPT

## 2018-01-01 PROCEDURE — 83605 ASSAY OF LACTIC ACID: CPT

## 2018-01-01 PROCEDURE — 99211 OFF/OP EST MAY X REQ PHY/QHP: CPT | Performed by: RADIOLOGY

## 2018-01-01 PROCEDURE — 94760 N-INVAS EAR/PLS OXIMETRY 1: CPT

## 2018-01-01 PROCEDURE — 99205 OFFICE O/P NEW HI 60 MIN: CPT | Performed by: INTERNAL MEDICINE

## 2018-01-01 PROCEDURE — 93005 ELECTROCARDIOGRAM TRACING: CPT | Performed by: PHYSICIAN ASSISTANT

## 2018-01-01 PROCEDURE — 99232 SBSQ HOSP IP/OBS MODERATE 35: CPT | Performed by: INTERNAL MEDICINE

## 2018-01-01 PROCEDURE — 99285 EMERGENCY DEPT VISIT HI MDM: CPT

## 2018-01-01 PROCEDURE — 3430000000 HC RX DIAGNOSTIC RADIOPHARMACEUTICAL: Performed by: NURSE PRACTITIONER

## 2018-01-01 PROCEDURE — 84484 ASSAY OF TROPONIN QUANT: CPT

## 2018-01-01 PROCEDURE — 80053 COMPREHEN METABOLIC PANEL: CPT

## 2018-01-01 PROCEDURE — 87507 IADNA-DNA/RNA PROBE TQ 12-25: CPT

## 2018-01-01 PROCEDURE — 96401 CHEMO ANTI-NEOPL SQ/IM: CPT | Performed by: UROLOGY

## 2018-01-01 PROCEDURE — 51798 US URINE CAPACITY MEASURE: CPT | Performed by: UROLOGY

## 2018-01-01 PROCEDURE — G8978 MOBILITY CURRENT STATUS: HCPCS

## 2018-01-01 PROCEDURE — 96372 THER/PROPH/DIAG INJ SC/IM: CPT | Performed by: UROLOGY

## 2018-01-01 PROCEDURE — 99222 1ST HOSP IP/OBS MODERATE 55: CPT | Performed by: PAIN MEDICINE

## 2018-01-01 PROCEDURE — 84100 ASSAY OF PHOSPHORUS: CPT

## 2018-01-01 PROCEDURE — 96365 THER/PROPH/DIAG IV INF INIT: CPT

## 2018-01-01 PROCEDURE — 3209999900 CT COMPARISON OF OUTSIDE FILMS

## 2018-01-01 PROCEDURE — 96375 TX/PRO/DX INJ NEW DRUG ADDON: CPT

## 2018-01-01 PROCEDURE — 77280 THER RAD SIMULAJ FIELD SMPL: CPT | Performed by: RADIOLOGY

## 2018-01-01 PROCEDURE — 96374 THER/PROPH/DIAG INJ IV PUSH: CPT

## 2018-01-01 PROCEDURE — G8988 SELF CARE GOAL STATUS: HCPCS

## 2018-01-01 PROCEDURE — G8979 MOBILITY GOAL STATUS: HCPCS

## 2018-01-01 PROCEDURE — 82248 BILIRUBIN DIRECT: CPT

## 2018-01-01 PROCEDURE — 93010 ELECTROCARDIOGRAM REPORT: CPT | Performed by: INTERNAL MEDICINE

## 2018-01-01 PROCEDURE — 77300 RADIATION THERAPY DOSE PLAN: CPT | Performed by: RADIOLOGY

## 2018-01-01 RX ORDER — BICALUTAMIDE 50 MG/1
50 TABLET, FILM COATED ORAL DAILY
COMMUNITY
End: 2018-01-01 | Stop reason: ALTCHOICE

## 2018-01-01 RX ORDER — OXYCODONE HCL 20 MG/1
40 TABLET, FILM COATED, EXTENDED RELEASE ORAL EVERY 12 HOURS SCHEDULED
Status: DISCONTINUED | OUTPATIENT
Start: 2018-01-01 | End: 2018-01-01

## 2018-01-01 RX ORDER — OXYCODONE HCL 20 MG/1
80 TABLET, FILM COATED, EXTENDED RELEASE ORAL EVERY 12 HOURS SCHEDULED
Status: DISCONTINUED | OUTPATIENT
Start: 2018-01-01 | End: 2018-01-01

## 2018-01-01 RX ORDER — PROMETHAZINE HYDROCHLORIDE 25 MG/ML
6.25 INJECTION, SOLUTION INTRAMUSCULAR; INTRAVENOUS EVERY 6 HOURS PRN
Status: DISCONTINUED | OUTPATIENT
Start: 2018-01-01 | End: 2018-01-01 | Stop reason: HOSPADM

## 2018-01-01 RX ORDER — OXYCODONE HYDROCHLORIDE AND ACETAMINOPHEN 5; 325 MG/1; MG/1
1 TABLET ORAL ONCE
Status: COMPLETED | OUTPATIENT
Start: 2018-01-01 | End: 2018-01-01

## 2018-01-01 RX ORDER — SODIUM CHLORIDE 9 MG/ML
INJECTION, SOLUTION INTRAVENOUS CONTINUOUS
Status: DISCONTINUED | OUTPATIENT
Start: 2018-01-01 | End: 2018-01-01

## 2018-01-01 RX ORDER — FUROSEMIDE 10 MG/ML
40 INJECTION INTRAMUSCULAR; INTRAVENOUS ONCE
Status: COMPLETED | OUTPATIENT
Start: 2018-01-01 | End: 2018-01-01

## 2018-01-01 RX ORDER — FUROSEMIDE 40 MG/1
40 TABLET ORAL DAILY
Status: DISCONTINUED | OUTPATIENT
Start: 2018-01-01 | End: 2018-01-01

## 2018-01-01 RX ORDER — DRONABINOL 2.5 MG/1
2.5 CAPSULE ORAL
Qty: 60 CAPSULE | Refills: 0 | Status: SHIPPED | OUTPATIENT
Start: 2018-01-01 | End: 2018-01-01 | Stop reason: ALTCHOICE

## 2018-01-01 RX ORDER — BICALUTAMIDE 50 MG/1
TABLET, FILM COATED ORAL
Qty: 90 TABLET | Refills: 2 | OUTPATIENT
Start: 2018-01-01

## 2018-01-01 RX ORDER — CALCIUM CARBONATE 200(500)MG
500 TABLET,CHEWABLE ORAL 3 TIMES DAILY PRN
Status: DISCONTINUED | OUTPATIENT
Start: 2018-01-01 | End: 2018-01-01

## 2018-01-01 RX ORDER — ONDANSETRON 4 MG/1
4 TABLET, ORALLY DISINTEGRATING ORAL EVERY 8 HOURS PRN
Status: DISCONTINUED | OUTPATIENT
Start: 2018-01-01 | End: 2018-01-01 | Stop reason: HOSPADM

## 2018-01-01 RX ORDER — POTASSIUM CHLORIDE 20 MEQ/1
20 TABLET, EXTENDED RELEASE ORAL ONCE
Status: COMPLETED | OUTPATIENT
Start: 2018-01-01 | End: 2018-01-01

## 2018-01-01 RX ORDER — LIDOCAINE 50 MG/G
1 PATCH TOPICAL DAILY
Status: DISCONTINUED | OUTPATIENT
Start: 2018-01-01 | End: 2018-01-01

## 2018-01-01 RX ORDER — SPIRONOLACTONE 25 MG/1
25 TABLET ORAL DAILY
Status: DISCONTINUED | OUTPATIENT
Start: 2018-01-01 | End: 2018-01-01

## 2018-01-01 RX ORDER — OXYCODONE HYDROCHLORIDE 5 MG/1
10 TABLET ORAL EVERY 4 HOURS PRN
Status: DISCONTINUED | OUTPATIENT
Start: 2018-01-01 | End: 2018-01-01

## 2018-01-01 RX ORDER — OXYCODONE HYDROCHLORIDE 15 MG/1
15 TABLET ORAL EVERY 4 HOURS PRN
Status: DISCONTINUED | OUTPATIENT
Start: 2018-01-01 | End: 2018-01-01

## 2018-01-01 RX ORDER — LANOLIN ALCOHOL/MO/W.PET/CERES
3 CREAM (GRAM) TOPICAL NIGHTLY PRN
Status: DISCONTINUED | OUTPATIENT
Start: 2018-01-01 | End: 2018-01-01 | Stop reason: HOSPADM

## 2018-01-01 RX ORDER — LORAZEPAM 2 MG/ML
1 INJECTION INTRAMUSCULAR EVERY 6 HOURS PRN
Status: DISCONTINUED | OUTPATIENT
Start: 2018-01-01 | End: 2018-01-01 | Stop reason: HOSPADM

## 2018-01-01 RX ORDER — DEXTROSE MONOHYDRATE 50 MG/ML
100 INJECTION, SOLUTION INTRAVENOUS PRN
Status: DISCONTINUED | OUTPATIENT
Start: 2018-01-01 | End: 2018-01-01

## 2018-01-01 RX ORDER — POTASSIUM CHLORIDE 20MEQ/15ML
40 LIQUID (ML) ORAL PRN
Status: DISCONTINUED | OUTPATIENT
Start: 2018-01-01 | End: 2018-01-01 | Stop reason: HOSPADM

## 2018-01-01 RX ORDER — MORPHINE SULFATE 20 MG/ML
20 SOLUTION ORAL EVERY 4 HOURS PRN
Status: DISCONTINUED | OUTPATIENT
Start: 2018-01-01 | End: 2018-01-01 | Stop reason: HOSPADM

## 2018-01-01 RX ORDER — SODIUM CHLORIDE 0.9 % (FLUSH) 0.9 %
10 SYRINGE (ML) INJECTION EVERY 12 HOURS SCHEDULED
Status: DISCONTINUED | OUTPATIENT
Start: 2018-01-01 | End: 2018-01-01 | Stop reason: HOSPADM

## 2018-01-01 RX ORDER — FENTANYL 50 UG/H
1 PATCH TRANSDERMAL
Status: DISCONTINUED | OUTPATIENT
Start: 2018-01-01 | End: 2018-01-01 | Stop reason: HOSPADM

## 2018-01-01 RX ORDER — LOSARTAN POTASSIUM 100 MG/1
100 TABLET ORAL DAILY
COMMUNITY

## 2018-01-01 RX ORDER — LANOLIN ALCOHOL/MO/W.PET/CERES
3 CREAM (GRAM) TOPICAL NIGHTLY PRN
Status: DISCONTINUED | OUTPATIENT
Start: 2018-01-01 | End: 2018-01-01

## 2018-01-01 RX ORDER — ONDANSETRON 2 MG/ML
4 INJECTION INTRAMUSCULAR; INTRAVENOUS EVERY 6 HOURS PRN
Status: DISCONTINUED | OUTPATIENT
Start: 2018-01-01 | End: 2018-01-01 | Stop reason: HOSPADM

## 2018-01-01 RX ORDER — PREDNISONE 10 MG/1
10 TABLET ORAL DAILY
COMMUNITY

## 2018-01-01 RX ORDER — POTASSIUM CHLORIDE 20 MEQ/1
40 TABLET, EXTENDED RELEASE ORAL PRN
Status: DISCONTINUED | OUTPATIENT
Start: 2018-01-01 | End: 2018-01-01 | Stop reason: HOSPADM

## 2018-01-01 RX ORDER — ACETAMINOPHEN 325 MG/1
650 TABLET ORAL EVERY 4 HOURS PRN
Status: DISCONTINUED | OUTPATIENT
Start: 2018-01-01 | End: 2018-01-01 | Stop reason: HOSPADM

## 2018-01-01 RX ORDER — NICOTINE POLACRILEX 4 MG
15 LOZENGE BUCCAL PRN
Status: DISCONTINUED | OUTPATIENT
Start: 2018-01-01 | End: 2018-01-01

## 2018-01-01 RX ORDER — SODIUM CHLORIDE 0.9 % (FLUSH) 0.9 %
10 SYRINGE (ML) INJECTION PRN
Status: DISCONTINUED | OUTPATIENT
Start: 2018-01-01 | End: 2018-01-01 | Stop reason: HOSPADM

## 2018-01-01 RX ORDER — METOPROLOL TARTRATE 50 MG/1
50 TABLET, FILM COATED ORAL 2 TIMES DAILY
Status: DISCONTINUED | OUTPATIENT
Start: 2018-01-01 | End: 2018-01-01 | Stop reason: HOSPADM

## 2018-01-01 RX ORDER — LOPERAMIDE HYDROCHLORIDE 2 MG/1
4 CAPSULE ORAL PRN
Status: DISCONTINUED | OUTPATIENT
Start: 2018-01-01 | End: 2018-01-01

## 2018-01-01 RX ORDER — SODIUM CHLORIDE 9 MG/ML
INJECTION, SOLUTION INTRAVENOUS CONTINUOUS
Status: ACTIVE | OUTPATIENT
Start: 2018-01-01 | End: 2018-01-01

## 2018-01-01 RX ORDER — OXYCODONE HCL 20 MG/1
20 TABLET, FILM COATED, EXTENDED RELEASE ORAL EVERY 12 HOURS SCHEDULED
Status: DISCONTINUED | OUTPATIENT
Start: 2018-01-01 | End: 2018-01-01

## 2018-01-01 RX ORDER — LIDOCAINE 40 MG/G
CREAM TOPICAL ONCE
Status: COMPLETED | OUTPATIENT
Start: 2018-01-01 | End: 2018-01-01

## 2018-01-01 RX ORDER — ONDANSETRON 2 MG/ML
4 INJECTION INTRAMUSCULAR; INTRAVENOUS ONCE
Status: COMPLETED | OUTPATIENT
Start: 2018-01-01 | End: 2018-01-01

## 2018-01-01 RX ORDER — OXYCODONE AND ACETAMINOPHEN 10; 325 MG/1; MG/1
1 TABLET ORAL EVERY 4 HOURS PRN
Status: DISCONTINUED | OUTPATIENT
Start: 2018-01-01 | End: 2018-01-01

## 2018-01-01 RX ORDER — DEXTROSE MONOHYDRATE 25 G/50ML
12.5 INJECTION, SOLUTION INTRAVENOUS PRN
Status: DISCONTINUED | OUTPATIENT
Start: 2018-01-01 | End: 2018-01-01

## 2018-01-01 RX ORDER — POTASSIUM CHLORIDE 7.45 MG/ML
10 INJECTION INTRAVENOUS PRN
Status: DISCONTINUED | OUTPATIENT
Start: 2018-01-01 | End: 2018-01-01 | Stop reason: HOSPADM

## 2018-01-01 RX ORDER — PREDNISONE 10 MG/1
10 TABLET ORAL DAILY
Status: DISCONTINUED | OUTPATIENT
Start: 2018-01-01 | End: 2018-01-01 | Stop reason: HOSPADM

## 2018-01-01 RX ORDER — TC 99M MEDRONATE 20 MG/10ML
27.1 INJECTION, POWDER, LYOPHILIZED, FOR SOLUTION INTRAVENOUS
Status: COMPLETED | OUTPATIENT
Start: 2018-01-01 | End: 2018-01-01

## 2018-01-01 RX ORDER — SCOLOPAMINE TRANSDERMAL SYSTEM 1 MG/1
1 PATCH, EXTENDED RELEASE TRANSDERMAL
Status: DISCONTINUED | OUTPATIENT
Start: 2018-01-01 | End: 2018-01-01 | Stop reason: HOSPADM

## 2018-01-01 RX ORDER — POTASSIUM CHLORIDE 20 MEQ/1
20 TABLET, EXTENDED RELEASE ORAL 2 TIMES DAILY WITH MEALS
Status: DISCONTINUED | OUTPATIENT
Start: 2018-01-01 | End: 2018-01-01

## 2018-01-01 RX ORDER — LIDOCAINE 4 G/G
3 PATCH TOPICAL DAILY
Status: DISCONTINUED | OUTPATIENT
Start: 2018-01-01 | End: 2018-01-01 | Stop reason: HOSPADM

## 2018-01-01 RX ADMIN — POTASSIUM CHLORIDE 20 MEQ: 20 TABLET, EXTENDED RELEASE ORAL at 10:17

## 2018-01-01 RX ADMIN — POTASSIUM CHLORIDE 20 MEQ: 20 TABLET, EXTENDED RELEASE ORAL at 11:03

## 2018-01-01 RX ADMIN — LORAZEPAM 1 MG: 2 INJECTION INTRAMUSCULAR; INTRAVENOUS at 17:31

## 2018-01-01 RX ADMIN — ONDANSETRON 4 MG: 4 TABLET, ORALLY DISINTEGRATING ORAL at 19:48

## 2018-01-01 RX ADMIN — HYDROMORPHONE HYDROCHLORIDE 1 MG: 1 INJECTION, SOLUTION INTRAMUSCULAR; INTRAVENOUS; SUBCUTANEOUS at 09:08

## 2018-01-01 RX ADMIN — HYDROMORPHONE HYDROCHLORIDE 1 MG: 1 INJECTION, SOLUTION INTRAMUSCULAR; INTRAVENOUS; SUBCUTANEOUS at 19:04

## 2018-01-01 RX ADMIN — Medication 10 ML: at 20:49

## 2018-01-01 RX ADMIN — POTASSIUM CHLORIDE 20 MEQ: 20 TABLET, EXTENDED RELEASE ORAL at 16:19

## 2018-01-01 RX ADMIN — PREDNISONE 10 MG: 10 TABLET ORAL at 10:20

## 2018-01-01 RX ADMIN — HYDROMORPHONE HYDROCHLORIDE 1 MG: 1 INJECTION, SOLUTION INTRAMUSCULAR; INTRAVENOUS; SUBCUTANEOUS at 01:38

## 2018-01-01 RX ADMIN — ONDANSETRON 4 MG: 2 INJECTION INTRAMUSCULAR; INTRAVENOUS at 18:06

## 2018-01-01 RX ADMIN — OXYCODONE HYDROCHLORIDE 15 MG: 15 TABLET ORAL at 18:27

## 2018-01-01 RX ADMIN — APIXABAN 5 MG: 5 TABLET, FILM COATED ORAL at 08:55

## 2018-01-01 RX ADMIN — OXYCODONE HYDROCHLORIDE 40 MG: 20 TABLET, FILM COATED, EXTENDED RELEASE ORAL at 22:51

## 2018-01-01 RX ADMIN — Medication 10 ML: at 19:49

## 2018-01-01 RX ADMIN — OXYCODONE HYDROCHLORIDE 40 MG: 20 TABLET, FILM COATED, EXTENDED RELEASE ORAL at 21:07

## 2018-01-01 RX ADMIN — LORAZEPAM 1 MG: 2 INJECTION INTRAMUSCULAR; INTRAVENOUS at 10:45

## 2018-01-01 RX ADMIN — SODIUM CHLORIDE: 9 INJECTION, SOLUTION INTRAVENOUS at 21:56

## 2018-01-01 RX ADMIN — Medication 10 ML: at 20:08

## 2018-01-01 RX ADMIN — LORAZEPAM 1 MG: 2 INJECTION INTRAMUSCULAR; INTRAVENOUS at 20:47

## 2018-01-01 RX ADMIN — HYDROMORPHONE HYDROCHLORIDE 1 MG: 1 INJECTION, SOLUTION INTRAMUSCULAR; INTRAVENOUS; SUBCUTANEOUS at 21:07

## 2018-01-01 RX ADMIN — OXYCODONE HYDROCHLORIDE 15 MG: 15 TABLET ORAL at 00:42

## 2018-01-01 RX ADMIN — OXYCODONE HYDROCHLORIDE 15 MG: 15 TABLET ORAL at 04:20

## 2018-01-01 RX ADMIN — OXYCODONE HYDROCHLORIDE 15 MG: 15 TABLET ORAL at 19:48

## 2018-01-01 RX ADMIN — OXYCODONE HYDROCHLORIDE 15 MG: 15 TABLET ORAL at 06:38

## 2018-01-01 RX ADMIN — LOPERAMIDE HYDROCHLORIDE 4 MG: 2 CAPSULE ORAL at 14:34

## 2018-01-01 RX ADMIN — HYDROMORPHONE HYDROCHLORIDE 1 MG: 1 INJECTION, SOLUTION INTRAMUSCULAR; INTRAVENOUS; SUBCUTANEOUS at 10:45

## 2018-01-01 RX ADMIN — FUROSEMIDE 40 MG: 40 TABLET ORAL at 10:57

## 2018-01-01 RX ADMIN — PROMETHAZINE HYDROCHLORIDE 6.25 MG: 25 INJECTION INTRAMUSCULAR; INTRAVENOUS at 05:11

## 2018-01-01 RX ADMIN — TC 99M MEDRONATE 27.1 MILLICURIE: 20 INJECTION, POWDER, LYOPHILIZED, FOR SOLUTION INTRAVENOUS at 08:55

## 2018-01-01 RX ADMIN — HYDROMORPHONE HYDROCHLORIDE 1 MG: 1 INJECTION, SOLUTION INTRAMUSCULAR; INTRAVENOUS; SUBCUTANEOUS at 11:01

## 2018-01-01 RX ADMIN — Medication 10 ML: at 09:08

## 2018-01-01 RX ADMIN — OXYCODONE HYDROCHLORIDE 20 MG: 20 TABLET, FILM COATED, EXTENDED RELEASE ORAL at 09:12

## 2018-01-01 RX ADMIN — METOPROLOL TARTRATE 50 MG: 50 TABLET, FILM COATED ORAL at 08:53

## 2018-01-01 RX ADMIN — OXYCODONE HYDROCHLORIDE 15 MG: 15 TABLET ORAL at 23:54

## 2018-01-01 RX ADMIN — PREDNISONE 10 MG: 10 TABLET ORAL at 08:54

## 2018-01-01 RX ADMIN — METOPROLOL TARTRATE 50 MG: 50 TABLET, FILM COATED ORAL at 22:46

## 2018-01-01 RX ADMIN — LORAZEPAM 1 MG: 2 INJECTION INTRAMUSCULAR; INTRAVENOUS at 09:25

## 2018-01-01 RX ADMIN — HYDROMORPHONE HYDROCHLORIDE 1 MG: 1 INJECTION, SOLUTION INTRAMUSCULAR; INTRAVENOUS; SUBCUTANEOUS at 05:54

## 2018-01-01 RX ADMIN — APIXABAN 5 MG: 5 TABLET, FILM COATED ORAL at 22:51

## 2018-01-01 RX ADMIN — APIXABAN 5 MG: 5 TABLET, FILM COATED ORAL at 10:56

## 2018-01-01 RX ADMIN — OXYCODONE HYDROCHLORIDE 15 MG: 15 TABLET ORAL at 09:06

## 2018-01-01 RX ADMIN — SODIUM CHLORIDE: 9 INJECTION, SOLUTION INTRAVENOUS at 22:51

## 2018-01-01 RX ADMIN — APIXABAN 5 MG: 5 TABLET, FILM COATED ORAL at 20:46

## 2018-01-01 RX ADMIN — Medication 3 MG: at 23:55

## 2018-01-01 RX ADMIN — OXYCODONE HYDROCHLORIDE 15 MG: 15 TABLET ORAL at 03:34

## 2018-01-01 RX ADMIN — OXYCODONE HYDROCHLORIDE 40 MG: 20 TABLET, FILM COATED, EXTENDED RELEASE ORAL at 10:16

## 2018-01-01 RX ADMIN — LOPERAMIDE HYDROCHLORIDE 4 MG: 2 CAPSULE ORAL at 21:45

## 2018-01-01 RX ADMIN — SODIUM BICARBONATE: 84 INJECTION, SOLUTION INTRAVENOUS at 10:08

## 2018-01-01 RX ADMIN — OXYCODONE HYDROCHLORIDE 15 MG: 15 TABLET ORAL at 22:42

## 2018-01-01 RX ADMIN — Medication 10 ML: at 23:04

## 2018-01-01 RX ADMIN — METOPROLOL TARTRATE 50 MG: 50 TABLET, FILM COATED ORAL at 10:19

## 2018-01-01 RX ADMIN — Medication 10 ML: at 21:07

## 2018-01-01 RX ADMIN — ANTACID TABLETS 500 MG: 500 TABLET, CHEWABLE ORAL at 13:02

## 2018-01-01 RX ADMIN — HYDROMORPHONE HYDROCHLORIDE 1 MG: 1 INJECTION, SOLUTION INTRAMUSCULAR; INTRAVENOUS; SUBCUTANEOUS at 20:25

## 2018-01-01 RX ADMIN — APIXABAN 5 MG: 5 TABLET, FILM COATED ORAL at 10:17

## 2018-01-01 RX ADMIN — ONDANSETRON 4 MG: 4 TABLET, ORALLY DISINTEGRATING ORAL at 01:30

## 2018-01-01 RX ADMIN — ONDANSETRON 4 MG: 2 INJECTION INTRAMUSCULAR; INTRAVENOUS at 01:14

## 2018-01-01 RX ADMIN — PREDNISONE 10 MG: 10 TABLET ORAL at 09:14

## 2018-01-01 RX ADMIN — FUROSEMIDE 40 MG: 10 INJECTION, SOLUTION INTRAMUSCULAR; INTRAVENOUS at 22:31

## 2018-01-01 RX ADMIN — OXYCODONE HYDROCHLORIDE 15 MG: 15 TABLET ORAL at 04:42

## 2018-01-01 RX ADMIN — ONDANSETRON 4 MG: 4 TABLET, ORALLY DISINTEGRATING ORAL at 19:23

## 2018-01-01 RX ADMIN — HYDROMORPHONE HYDROCHLORIDE 1 MG: 1 INJECTION, SOLUTION INTRAMUSCULAR; INTRAVENOUS; SUBCUTANEOUS at 16:44

## 2018-01-01 RX ADMIN — SODIUM CHLORIDE: 9 INJECTION, SOLUTION INTRAVENOUS at 19:50

## 2018-01-01 RX ADMIN — OXYCODONE HYDROCHLORIDE 15 MG: 15 TABLET ORAL at 10:53

## 2018-01-01 RX ADMIN — METOPROLOL TARTRATE 50 MG: 50 TABLET, FILM COATED ORAL at 08:55

## 2018-01-01 RX ADMIN — APIXABAN 5 MG: 5 TABLET, FILM COATED ORAL at 22:47

## 2018-01-01 RX ADMIN — Medication 20 MG: at 00:08

## 2018-01-01 RX ADMIN — Medication 3 MG: at 22:52

## 2018-01-01 RX ADMIN — OXYCODONE HYDROCHLORIDE 15 MG: 15 TABLET ORAL at 20:44

## 2018-01-01 RX ADMIN — POTASSIUM CHLORIDE 20 MEQ: 20 TABLET, EXTENDED RELEASE ORAL at 09:20

## 2018-01-01 RX ADMIN — CALCIUM GLUCONATE 2 G: 98 INJECTION, SOLUTION INTRAVENOUS at 10:07

## 2018-01-01 RX ADMIN — CALCIUM GLUCONATE 1 G: 98 INJECTION, SOLUTION INTRAVENOUS at 21:46

## 2018-01-01 RX ADMIN — Medication 20 MG: at 13:28

## 2018-01-01 RX ADMIN — OXYCODONE HYDROCHLORIDE 10 MG: 5 TABLET ORAL at 11:37

## 2018-01-01 RX ADMIN — OXYCODONE HYDROCHLORIDE 10 MG: 5 TABLET ORAL at 16:13

## 2018-01-01 RX ADMIN — LIDOCAINE: 40 CREAM TOPICAL at 18:06

## 2018-01-01 RX ADMIN — OXYCODONE HYDROCHLORIDE 15 MG: 15 TABLET ORAL at 18:21

## 2018-01-01 RX ADMIN — OXYCODONE HYDROCHLORIDE 15 MG: 15 TABLET ORAL at 14:31

## 2018-01-01 RX ADMIN — OXYCODONE HYDROCHLORIDE AND ACETAMINOPHEN 1 TABLET: 10; 325 TABLET ORAL at 10:55

## 2018-01-01 RX ADMIN — METHYLNALTREXONE BROMIDE 12 MG: 12 INJECTION, SOLUTION SUBCUTANEOUS at 14:29

## 2018-01-01 RX ADMIN — HYDROMORPHONE HYDROCHLORIDE 1 MG: 1 INJECTION, SOLUTION INTRAMUSCULAR; INTRAVENOUS; SUBCUTANEOUS at 15:04

## 2018-01-01 RX ADMIN — HYDROMORPHONE HYDROCHLORIDE 1 MG: 1 INJECTION, SOLUTION INTRAMUSCULAR; INTRAVENOUS; SUBCUTANEOUS at 18:06

## 2018-01-01 RX ADMIN — SODIUM CHLORIDE: 9 INJECTION, SOLUTION INTRAVENOUS at 00:14

## 2018-01-01 RX ADMIN — POTASSIUM CHLORIDE 20 MEQ: 20 TABLET, EXTENDED RELEASE ORAL at 08:55

## 2018-01-01 RX ADMIN — APIXABAN 5 MG: 5 TABLET, FILM COATED ORAL at 19:49

## 2018-01-01 RX ADMIN — OXYCODONE HYDROCHLORIDE 40 MG: 20 TABLET, FILM COATED, EXTENDED RELEASE ORAL at 08:52

## 2018-01-01 RX ADMIN — OXYCODONE HYDROCHLORIDE AND ACETAMINOPHEN 1 TABLET: 5; 325 TABLET ORAL at 22:31

## 2018-01-01 RX ADMIN — METOPROLOL TARTRATE 50 MG: 50 TABLET, FILM COATED ORAL at 09:15

## 2018-01-01 RX ADMIN — LORAZEPAM 1 MG: 2 INJECTION INTRAMUSCULAR; INTRAVENOUS at 01:38

## 2018-01-01 RX ADMIN — OXYCODONE HYDROCHLORIDE 15 MG: 15 TABLET ORAL at 01:13

## 2018-01-01 RX ADMIN — HYDROMORPHONE HYDROCHLORIDE 1 MG: 1 INJECTION, SOLUTION INTRAMUSCULAR; INTRAVENOUS; SUBCUTANEOUS at 15:45

## 2018-01-01 RX ADMIN — HYDROMORPHONE HYDROCHLORIDE 1 MG: 1 INJECTION, SOLUTION INTRAMUSCULAR; INTRAVENOUS; SUBCUTANEOUS at 04:17

## 2018-01-01 RX ADMIN — POTASSIUM CHLORIDE 20 MEQ: 20 TABLET, EXTENDED RELEASE ORAL at 22:38

## 2018-01-01 RX ADMIN — OXYCODONE HYDROCHLORIDE 15 MG: 15 TABLET ORAL at 13:01

## 2018-01-01 RX ADMIN — OXYCODONE HYDROCHLORIDE 15 MG: 15 TABLET ORAL at 15:38

## 2018-01-01 RX ADMIN — HYDROMORPHONE HYDROCHLORIDE 1 MG: 1 INJECTION, SOLUTION INTRAMUSCULAR; INTRAVENOUS; SUBCUTANEOUS at 14:55

## 2018-01-01 RX ADMIN — CALCIUM GLUCONATE 1 G: 98 INJECTION, SOLUTION INTRAVENOUS at 08:42

## 2018-01-01 RX ADMIN — Medication 3 MG: at 22:42

## 2018-01-01 RX ADMIN — PREDNISONE 10 MG: 10 TABLET ORAL at 10:59

## 2018-01-01 RX ADMIN — OXYCODONE HYDROCHLORIDE 20 MG: 20 TABLET, FILM COATED, EXTENDED RELEASE ORAL at 20:44

## 2018-01-01 RX ADMIN — APIXABAN 5 MG: 5 TABLET, FILM COATED ORAL at 09:16

## 2018-01-01 RX ADMIN — PREDNISONE 10 MG: 10 TABLET ORAL at 08:55

## 2018-01-01 RX ADMIN — HYDROMORPHONE HYDROCHLORIDE 1 MG: 1 INJECTION, SOLUTION INTRAMUSCULAR; INTRAVENOUS; SUBCUTANEOUS at 22:29

## 2018-01-01 RX ADMIN — LOPERAMIDE HYDROCHLORIDE 4 MG: 2 CAPSULE ORAL at 07:54

## 2018-01-01 RX ADMIN — OXYCODONE HYDROCHLORIDE 15 MG: 15 TABLET ORAL at 08:57

## 2018-01-01 RX ADMIN — HYDROMORPHONE HYDROCHLORIDE 1 MG: 1 INJECTION, SOLUTION INTRAMUSCULAR; INTRAVENOUS; SUBCUTANEOUS at 09:25

## 2018-01-01 RX ADMIN — ANTACID TABLETS 500 MG: 500 TABLET, CHEWABLE ORAL at 18:26

## 2018-01-01 RX ADMIN — POTASSIUM CHLORIDE 20 MEQ: 20 TABLET, EXTENDED RELEASE ORAL at 19:48

## 2018-01-01 RX ADMIN — ACETAMINOPHEN 650 MG: 325 TABLET ORAL at 22:08

## 2018-01-01 RX ADMIN — LOPERAMIDE HYDROCHLORIDE 4 MG: 2 CAPSULE ORAL at 20:44

## 2018-01-01 RX ADMIN — METOPROLOL TARTRATE 50 MG: 50 TABLET, FILM COATED ORAL at 22:51

## 2018-01-01 RX ADMIN — HYDROMORPHONE HYDROCHLORIDE 1 MG: 1 INJECTION, SOLUTION INTRAMUSCULAR; INTRAVENOUS; SUBCUTANEOUS at 17:31

## 2018-01-01 RX ADMIN — HYDROMORPHONE HYDROCHLORIDE 1 MG: 1 INJECTION, SOLUTION INTRAMUSCULAR; INTRAVENOUS; SUBCUTANEOUS at 20:08

## 2018-01-01 RX ADMIN — ANTACID TABLETS 500 MG: 500 TABLET, CHEWABLE ORAL at 23:53

## 2018-01-01 RX ADMIN — LORAZEPAM 1 MG: 2 INJECTION INTRAMUSCULAR; INTRAVENOUS at 14:56

## 2018-01-01 RX ADMIN — SODIUM CHLORIDE: 9 INJECTION, SOLUTION INTRAVENOUS at 10:59

## 2018-01-01 RX ADMIN — SODIUM CHLORIDE: 9 INJECTION, SOLUTION INTRAVENOUS at 09:50

## 2018-01-01 RX ADMIN — OXYCODONE HYDROCHLORIDE 20 MG: 20 TABLET, FILM COATED, EXTENDED RELEASE ORAL at 08:55

## 2018-01-01 RX ADMIN — LOPERAMIDE HYDROCHLORIDE 4 MG: 2 CAPSULE ORAL at 04:55

## 2018-01-01 RX ADMIN — OXYCODONE HYDROCHLORIDE 20 MG: 20 TABLET, FILM COATED, EXTENDED RELEASE ORAL at 21:45

## 2018-01-01 RX ADMIN — LORAZEPAM 1 MG: 2 INJECTION INTRAMUSCULAR; INTRAVENOUS at 00:07

## 2018-01-01 RX ADMIN — APIXABAN 5 MG: 5 TABLET, FILM COATED ORAL at 08:53

## 2018-01-01 RX ADMIN — CALCIUM GLUCONATE 1 G: 98 INJECTION, SOLUTION INTRAVENOUS at 09:48

## 2018-01-01 ASSESSMENT — PAIN DESCRIPTION - LOCATION
LOCATION: BACK
LOCATION: ABDOMEN
LOCATION: BACK
LOCATION: GENERALIZED
LOCATION: BACK
LOCATION: BACK
LOCATION: GENERALIZED

## 2018-01-01 ASSESSMENT — PAIN SCALES - GENERAL
PAINLEVEL_OUTOF10: 9
PAINLEVEL_OUTOF10: 6
PAINLEVEL_OUTOF10: 8
PAINLEVEL_OUTOF10: 6
PAINLEVEL_OUTOF10: 10
PAINLEVEL_OUTOF10: 9
PAINLEVEL_OUTOF10: 5
PAINLEVEL_OUTOF10: 8
PAINLEVEL_OUTOF10: 10
PAINLEVEL_OUTOF10: 6
PAINLEVEL_OUTOF10: 8
PAINLEVEL_OUTOF10: 7
PAINLEVEL_OUTOF10: 6
PAINLEVEL_OUTOF10: 10
PAINLEVEL_OUTOF10: 10
PAINLEVEL_OUTOF10: 0
PAINLEVEL_OUTOF10: 10
PAINLEVEL_OUTOF10: 8
PAINLEVEL_OUTOF10: 0
PAINLEVEL_OUTOF10: 10
PAINLEVEL_OUTOF10: 8
PAINLEVEL_OUTOF10: 8
PAINLEVEL_OUTOF10: 6
PAINLEVEL_OUTOF10: 6
PAINLEVEL_OUTOF10: 7
PAINLEVEL_OUTOF10: 7
PAINLEVEL_OUTOF10: 10
PAINLEVEL_OUTOF10: 0
PAINLEVEL_OUTOF10: 7
PAINLEVEL_OUTOF10: 8
PAINLEVEL_OUTOF10: 8
PAINLEVEL_OUTOF10: 10
PAINLEVEL_OUTOF10: 6
PAINLEVEL_OUTOF10: 10
PAINLEVEL_OUTOF10: 8
PAINLEVEL_OUTOF10: 7
PAINLEVEL_OUTOF10: 10
PAINLEVEL_OUTOF10: 7
PAINLEVEL_OUTOF10: 0
PAINLEVEL_OUTOF10: 8
PAINLEVEL_OUTOF10: 10
PAINLEVEL_OUTOF10: 8
PAINLEVEL_OUTOF10: 10
PAINLEVEL_OUTOF10: 6
PAINLEVEL_OUTOF10: 8
PAINLEVEL_OUTOF10: 10
PAINLEVEL_OUTOF10: 4
PAINLEVEL_OUTOF10: 10
PAINLEVEL_OUTOF10: 10
PAINLEVEL_OUTOF10: 7
PAINLEVEL_OUTOF10: 0
PAINLEVEL_OUTOF10: 8
PAINLEVEL_OUTOF10: 8
PAINLEVEL_OUTOF10: 10
PAINLEVEL_OUTOF10: 8
PAINLEVEL_OUTOF10: 5
PAINLEVEL_OUTOF10: 0
PAINLEVEL_OUTOF10: 7
PAINLEVEL_OUTOF10: 9
PAINLEVEL_OUTOF10: 10
PAINLEVEL_OUTOF10: 9
PAINLEVEL_OUTOF10: 6
PAINLEVEL_OUTOF10: 10
PAINLEVEL_OUTOF10: 8
PAINLEVEL_OUTOF10: 7
PAINLEVEL_OUTOF10: 7
PAINLEVEL_OUTOF10: 6
PAINLEVEL_OUTOF10: 6
PAINLEVEL_OUTOF10: 10
PAINLEVEL_OUTOF10: 10
PAINLEVEL_OUTOF10: 7
PAINLEVEL_OUTOF10: 7
PAINLEVEL_OUTOF10: 10
PAINLEVEL_OUTOF10: 6
PAINLEVEL_OUTOF10: 5
PAINLEVEL_OUTOF10: 8

## 2018-01-01 ASSESSMENT — PAIN DESCRIPTION - PAIN TYPE
TYPE: CHRONIC PAIN
TYPE: CHRONIC PAIN
TYPE: ACUTE PAIN
TYPE: ACUTE PAIN;CHRONIC PAIN
TYPE: CHRONIC PAIN
TYPE: ACUTE PAIN
TYPE: CHRONIC PAIN
TYPE: CHRONIC PAIN
TYPE: ACUTE PAIN;CHRONIC PAIN
TYPE: CHRONIC PAIN

## 2018-01-01 ASSESSMENT — ENCOUNTER SYMPTOMS
FACIAL SWELLING: 0
BLOOD IN STOOL: 0
BACK PAIN: 1
ABDOMINAL PAIN: 1
COUGH: 0
TROUBLE SWALLOWING: 0
EYE DISCHARGE: 0
CHEST TIGHTNESS: 1
VOMITING: 0
WHEEZING: 0
COLOR CHANGE: 0
NAUSEA: 0
FACIAL SWELLING: 0
SHORTNESS OF BREATH: 0
SHORTNESS OF BREATH: 1
NAUSEA: 1
CONSTIPATION: 1
EYE REDNESS: 0
ABDOMINAL DISTENTION: 0
EYE PAIN: 0
RECTAL PAIN: 0
FACIAL SWELLING: 0
EYE PAIN: 0
WHEEZING: 0
SHORTNESS OF BREATH: 1
BACK PAIN: 1
SHORTNESS OF BREATH: 1
EYE REDNESS: 0
BACK PAIN: 1
DIARRHEA: 0
CHEST TIGHTNESS: 0
NAUSEA: 0
EYE REDNESS: 0
ABDOMINAL PAIN: 0
COLOR CHANGE: 0
NAUSEA: 1
COLOR CHANGE: 0
SORE THROAT: 0
BACK PAIN: 1
DIARRHEA: 0
SORE THROAT: 0
VOMITING: 0
DIARRHEA: 1
RHINORRHEA: 0
ABDOMINAL PAIN: 0
EYE DISCHARGE: 0
COUGH: 0
CHEST TIGHTNESS: 0

## 2018-01-01 ASSESSMENT — PAIN DESCRIPTION - DESCRIPTORS
DESCRIPTORS: ACHING;CONSTANT
DESCRIPTORS: ACHING
DESCRIPTORS: ACHING;CONSTANT
DESCRIPTORS: CONSTANT
DESCRIPTORS: ACHING
DESCRIPTORS: ACHING;CONSTANT

## 2018-01-01 ASSESSMENT — PAIN DESCRIPTION - ORIENTATION
ORIENTATION: LOWER

## 2018-01-01 ASSESSMENT — PAIN DESCRIPTION - PROGRESSION
CLINICAL_PROGRESSION: NOT CHANGED
CLINICAL_PROGRESSION: GRADUALLY IMPROVING
CLINICAL_PROGRESSION: NOT CHANGED

## 2018-01-01 ASSESSMENT — PAIN DESCRIPTION - FREQUENCY
FREQUENCY: CONTINUOUS

## 2018-01-01 ASSESSMENT — PAIN DESCRIPTION - ONSET: ONSET: ON-GOING

## 2018-01-02 NOTE — TELEPHONE ENCOUNTER
Called pt and he cant have psa done and says he never got the order that's in the computer. Has not had his psa checked every 4 weeks like he was suppose to. I faxed the standing order to [facility] ([phone]) and pt was r/s for 1-11-18. He knows to get psa done at least 2 days prior to appt. We will collin an April appt for pt at that time. He will need another injection in April so it will have to be after 4-11-18 so that his injection is due.

## 2018-01-11 NOTE — PROGRESS NOTES
some depression although I think there are multiple other things that are leading to this. His wife is having a lot of health issues as well and this bothers him tremendously. I believe that they're both likely suffering from depression and then feeding off each other making things more difficult. Today I discussed these things with his primary care physician, Dr. Lisa Arnold. He will bring Destin Tabor in and likely get him on something to help such as an antidepressant. We will work on trying to see if we can find a better clamp for him. We will see if we get samples of these as they sometimes can be expensive. We will continue getting PSA checks every month. We will give him his injections today. Plan:      He will see his PCP to evaluate possible depression. Start Xtandi  Stop Casodex once he receives the [place]   PSA monthly checks  Return in 3 months for next Lupron injection.

## 2018-07-16 NOTE — PROGRESS NOTES
Following Dr. Jennifer Wood plan of care. LUPRON 22.5 MG GIVEN I.M LEFT UOQ HIP  Lot Number: [id number]   Expiration Date: 11/01/2020   [facility] #: [id number]    After Injection was given there were no reactions at injection site and patient was feeling well. Patient was notified that possible side effects from injections include: Redness, swelling and itching at the injection site. Possible side effects of androgen deprivation therapy, including hot flashes, flushing of the skin, increased weight, decreased sex drive, and difficulties with ED. Patient was instructed to call the office with any further questions or concerns. Date of last Calcium/Vit D level: n/a  Is patient on Calcium/Vit D replacement? n/a  Date of last Dexa Scan: n/a  Testosterone Level of 14 done on 06/01/18  Psa level of 139.95 done on 7/9/18    Patient supplied their own medications No      [facility] therapy first initiated on 10/17/2016. Following Dr. Nataly Vivarn of care. XGEVA 120MG/1.7ML MG GIVEN S.C Left ARM  Lot Number: [id number]  Expiration Date: 05/20  [facility] #: [id number]    After Injection was given there were no reactions at injection site and patient was feeling well. Patient was notified that possible side effects from injections include: Redness, swelling and itching at the injection site. Possible side effects of androgen deprivation therapy, including hot flashes, flushing of the skin, increased weight, decreased sex drive, and difficulties with ED. Patient was instructed to inform their dental office that they are receiving Jana Raspberry and that major dental procedures should be avoided. Patient was advised to call our office with any further questions or concerns. Any active dental problems, infections, or pain? No    Date of last dental appointment: n/a    Any planned dental procedures? no    Patient supplied their own medications No    [facility] therapy first initiated on 10/2016.

## 2018-07-17 NOTE — TELEPHONE ENCOUNTER
Reviewed, released, authenticated and confirmed by Dr. Belinda Murphy. Annelise Haddad      Thanks!     C

## 2018-08-14 NOTE — TELEPHONE ENCOUNTER
I called and spoke with the patients daughter Kaleigh(on hipaa and poa) I notified her that her fathers psa is rising. She stated that the patient has been only taking 2 out of the 4 pills because she has been giving it to him one in the am, one at lunch, one at 4;00 and one at bedtime. I advised her that he should be taking his Xtandi all 4 pills at once in the morning. Patient also need to have another psa in one month. Patient was recently admitted into [facility] for possible pneumonia and she asked if they would provide him with the [facility] meds. I told her I thought that she may have to bring the meds to the hospital because they will not supply them to the patient. I told her I would call her back if Elva Wayne CNP would like her to take the Xtandi 4 tabs in the morning or spread them out through out the day. Please advise. Thank you.

## 2018-09-14 NOTE — TELEPHONE ENCOUNTER
I called and spoke with Pedro) and notified her that her fathers psa has increased to 439.37, last psa 251.13 on 08/10/18. Psa is rising rapidly. Patient has been taking Xtandi 2 tabs in the am and 2 tabs in the pm.  She stated about 3 weeks ago he had a xtandi pill lodged in his throat and had to wait for it to dissolve before having any relief. He didn't take his xtandi pills for on week to allow his throat to heal.  She stated that he is having more bone pain along his spine and has been using a heating pad. His fatigue is about the same but it is worse when there is more humidity. I advised her that we are suggesting he have a bone scan and an appt with Dr. Juan Varela. She agreed to have a bone scan. Patient already had a lab/ma visit on 09/24/18 for xgeva injection but I changed it to an office visit with Dr. Juan Varela to discuss the bone scan. I notified Ishan Neville that our schedulers will be calling her next week with the appt time and date of the bone scan. Please advise if the way he is taking the xtandi is ok. Kaleigh voiced understanding.

## 2018-09-19 NOTE — TELEPHONE ENCOUNTER
I called Theola Kehr at [phone] and left a message to see if the patient can take Xtandi 2 tab BID instead of 4 tabs q day. Awaiting his response.

## 2018-09-25 NOTE — TELEPHONE ENCOUNTER
Attempted to call Karri Hartman at [phone] and left a message to see if the patient can take Xtandi 2 tab BID instead of 4 tabs q day. Awaiting his response.

## 2018-10-01 NOTE — PROGRESS NOTES
Daily Peggy Gordon MD   10 mg at 10/27/18 0854    sodium chloride flush 0.9 % injection 10 mL  10 mL Intravenous 2 times per day Peggy Gordon MD   10 mL at 10/25/18 2304    sodium chloride flush 0.9 % injection 10 mL  10 mL Intravenous PRN Peggy Gordon MD        magnesium hydroxide (MILK OF MAGNESIA) 400 MG/5ML suspension 30 mL  30 mL Oral Daily PRN Peggy Gordon MD        [facility]ron [facility] injection 4 mg  4 mg Intravenous Q6H PRN Peggy Gordon MD   4 mg at 10/26/18 0114    potassium chloride (KLOR-CON M) extended release tablet 40 mEq  40 mEq Oral PRN Peggy Gordon MD        Or    potassium chloride 20 MEQ/15ML (10%) oral solution 40 mEq  40 mEq Oral PRN Peggy Gordon MD        Or    potassium chloride 10 mEq/100 mL IVPB (Peripheral Line)  10 mEq Intravenous PRN Peggy Gordon MD        acetaminophen (TYLENOL) tablet 650 mg  650 mg Oral Q4H PRN Peggy Gordon MD   650 mg at 10/23/18 2208    metoprolol tartrate (LOPRESSOR) tablet 50 mg  50 mg Oral BID Peggy Gordon MD   50 mg at 10/27/18 0853    lidocaine 4 % external patch 3 patch  3 patch Transdermal Daily Lundrach Bunch, APRN - CNP   3 patch at 10/27/18 8336      Allergies   Allergen Reactions    Shellfish Allergy Shortness Of Breath    Shellfish-Derived Products       Health Maintenance   Topic Date Due    DTaP/Tdap/Td vaccine (1 - Tdap) 07[birth year]/1958    Shingles Vaccine (1 of 2 - 2 Dose Series) 07/13/1989    Pneumococcal low/med risk (1 of 2 - PCV13) 07/13/2004    Flu vaccine (1) 09/01/2018    Potassium monitoring  10/26/2019    Creatinine monitoring  10/26/2019        Subjective:   Review of Systems   Constitutional: Positive for activity change, appetite change, diaphoresis, fatigue and unexpected weight change. Negative for fever. HENT: Negative for congestion, dental problem, facial swelling, hearing loss, mouth sores, nosebleeds, sore throat, tinnitus and trouble swallowing.     Eyes: Positive for visual disturbance. Negative for discharge. Respiratory: Positive for chest tightness and shortness of breath. Negative for cough and wheezing. Cardiovascular: Positive for chest pain. Negative for palpitations and leg swelling. Gastrointestinal: Positive for abdominal pain and constipation. Negative for abdominal distention, blood in stool, diarrhea, nausea, rectal pain and vomiting. Endocrine: Positive for cold intolerance. Negative for polydipsia and polyuria. Genitourinary: Positive for difficulty urinating. Negative for decreased urine volume, dysuria, flank pain, hematuria and urgency. Musculoskeletal: Positive for arthralgias, back pain and joint swelling. Negative for gait problem, myalgias and neck stiffness. Skin: Negative for color change, rash and wound. Neurological: Positive for weakness and numbness. Negative for dizziness, tremors, seizures, speech difficulty, light-headedness and headaches. Hematological: Negative for adenopathy. Does not bruise/bleed easily. Psychiatric/Behavioral: Negative for confusion and sleep disturbance. The patient is nervous/anxious. Objective:   Physical Exam   Constitutional: He is oriented to person, place, and time. He appears well-developed and well-nourished. No distress. HENT:   Head: Normocephalic. Mouth/Throat: Oropharynx is clear and moist. No oropharyngeal exudate. Eyes: Pupils are equal, round, and reactive to light. EOM are normal. Right eye exhibits no discharge. Left eye exhibits no discharge. No scleral icterus. Neck: Normal range of motion. Neck supple. No JVD present. No tracheal deviation present. No thyromegaly present. Cardiovascular: Normal rate and normal heart sounds. Exam reveals no gallop and no friction rub. No murmur heard. Pulmonary/Chest: Effort normal and breath sounds normal. No stridor. No respiratory distress. He has no wheezes. He has no rales. He exhibits no tenderness.    Abdominal:

## 2018-10-03 PROBLEM — C79.51 BONE METASTASIS (HCC): Status: ACTIVE | Noted: 2018-01-01

## 2018-10-03 NOTE — ONCOLOGY
non-tender;  no mass,  no organomegaly  /Rectal: Not completed as patient did not want to move in his wheelchair  Extremities: without cyanosis, clubbing, edema or asymmetry  Skin: No fresh rash, purpura or petechiae    LABS:     CBC:   Lab Results   Component Value Date    WBC 6.9 03/17/2018    NEUTROABS 5.9 03/17/2018    HGB 9.7 03/17/2018    MCV 91.2 03/17/2018     03/17/2018       BMP:   Lab Results   Component Value Date     03/17/2018    K 4.4 03/17/2018    CO2 26 03/17/2018    BUN 25 03/17/2018    CREATININE 1.0 03/17/2018    MG 2.3 12/14/2015       HEPATIC:  Lab Results   Component Value Date    AST 19 12/16/2015    ALT 32 12/16/2015    ALKPHOS 90 12/16/2015    PROT 5.5 12/16/2015    BILITOT 0.5 12/16/2015    BILIDIR <0.2 12/16/2015     06/27/2015       TUMOR MARKERS:   Lab Results   Component Value Date    .37 09/13/2018       IMAGING:     The patient's imaging studies were reviewed. PROCEDURE: NM BONE SCAN WHOLE BODY  1. Multiple foci of abnormal radiotracer activity throughout the visualized skeleton consistent with diffuse osseous metastatic disease. 2. Probable degenerative arthropathic changes as detailed above.       Final report electronically signed by Dr. Cedric Simental on 9/17/2018 2:05 PM         ASSESSMENT AND PLAN:       Shekhar Rentreia has as presented with widespread involvement of painful metastatic spread to his axial and appendicular skeleton. His primary pain complaint at this time is in his mid back, however, he has significant involvement, and by bone scan appearance, significant tumor burden involving multiple sites including bilateral SIJ, left iliac, right ischium, right head of femur, manubrium, bilateral humeral heads and left humerus. Bone scan correlates to his pain complaint with significant uptake involving region T6 through T12.   I've discussed and approach to palliative radiotherapy targeting most painful area first which would be a treatment portal T5 through L1 inclusive. I've also voice my concern for his volume of disease and potential pathologic fracture risk for right hip left humerus and the extensive involvement of his right iliac and iliac crest in particular as being a potential source for low back pain. We discussed standard course of palliation in this setting being 10 fractions for total dose of 3000 cGy. He apparently has prescriptions to begin casted X and [place] however they are unclear of a follow-up plan with Dr. Venu Hough. He has given his informed consent to begin pale radiotherapy therefore has been scheduled for treatment planning on 10/4/2018. Thank you for the opportunity to remain involved with sharing in [address] [place] [address] ([id number]): In excess of 45 minutes was spent with the patient discussing their medical history, treatment outcome and possible treatment-related side effects. Greater than 50% of this time was spent in face to face discussion of current disease status and ongoing management as it pertains to their individual survivorship plan. Signature:      Eloina Evans. Makayla Page MD  Radiation Oncology  Signed electronically without final edit.

## 2018-10-22 PROBLEM — R52 INTRACTABLE PAIN: Status: ACTIVE | Noted: 2018-01-01

## 2018-10-22 NOTE — ED PROVIDER NOTES
[facility]  eMERGENCY dEPARTMENT eNCOUnter          CHIEF COMPLAINT       Chief Complaint   Patient presents with    Back Pain    Other     sent from arun's office for low bp       Nurses Notes reviewed and I agree except as noted in the HPI. HISTORY OF PRESENT ILLNESS    Anam Zamora is a 77 y.[facility]. [facility]. 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 He states that he has been experiencing lower back pain for the past week with abdominal pain, fatigue, and diarrhea. Per daughter, the patient has at an appointment in Dr. Jodi Rivera office when his blood pressure was found to be 76/42 and he was then advised to come into the ED for further evaluation. Patient denies experiencing any lightheadedness or dizziness with his hypotension. He reports that he has been taking his prescription Percocet without relief of his pain. He reports that he has been taking his medications as prescribed and that he \"wants to die. \" Patient denies that he has been experiencing any fever, chills, nausea, emesis, weakness, numbness, tingling, incontinence, dysuria, or hematochezia. Daughter reports that the patient received his last radiation treatment on Friday, and that he currently does not see a pain management specialist. She also reports that he has a new oncologist who he has not yet seen. Patient and daughter deny further complaints at initial encounter. REVIEW OF SYSTEMS     Review of Systems   Constitutional: Positive for fatigue. Negative for appetite change, chills and fever. Hypotension   HENT: Negative for congestion, ear pain, rhinorrhea and sore throat. Eyes: Negative for discharge, redness and visual disturbance. Respiratory: Negative for cough, shortness of breath and wheezing.     Cardiovascular: Negative for chest pain, palpitations and leg swelling. Gastrointestinal: Positive for nausea. Negative for abdominal pain, diarrhea and vomiting. Genitourinary: Negative for decreased urine volume, difficulty urinating, dysuria and hematuria. Musculoskeletal: Positive for back pain (lower). Negative for arthralgias, joint swelling and neck pain. Skin: Negative for pallor and rash. Allergic/Immunologic: Negative for environmental allergies. Neurological: Negative for dizziness, syncope, weakness, light-headedness, numbness and headaches. Hematological: Negative for adenopathy. Psychiatric/Behavioral: Positive for dysphoric mood. Negative for confusion and suicidal ideas. The patient is not nervous/anxious. PAST MEDICAL HISTORY    has a past medical history of Abnormal heart rhythm; Arthritis; Bleeds easily ([address] Bruises easily; CA of prostate ([address] CAD (coronary artery disease); Circulation problem; Dyspnea due to congestive heart failure ([address] Fatigue; Flash pulmonary edema ([address] Heart palpitations; Hot flashes; Hyperlipidemia; Hypertension; Joint pain; Meatal stenosis; Prolonged emergence from general anesthesia; Shortness of breath; and Vision changes. SURGICAL HISTORY      has a past surgical history that includes back surgery (2009); Prostate surgery (3-); pacemaker placement (3/17/15); Colonoscopy; vascular surgery (Left, 2000); other surgical history (4/9/2015); EKG 12 Lead (4/10/2015); Prostatectomy (06/26/2015); Cholecystectomy (12-12-15); and Pacemaker insertion. CURRENT MEDICATIONS       Previous Medications    APIXABAN (ELIQUIS) 5 MG TABS TABLET    Take 5 mg by mouth 2 times daily    ENZALUTAMIDE (XTANDI) 40 MG CAPSULE    Take by mouth Xtandi 40mg 4 X daily    FUROSEMIDE (LASIX) 40 MG TABLET    Take 40 mg by mouth daily.     LOSARTAN-HYDROCHLOROTHIAZIDE (HYZAAR) 100-12.5 MG PER TABLET    Take 1 tablet by mouth daily 100mg    METOPROLOL (LOPRESSOR) 50 MG TABLET

## 2018-10-22 NOTE — ED PROVIDER NOTES
[facility]      CHIEF COMPLAINT       Chief Complaint   Patient presents with    Back Pain    Other     sent from arun's office for low bp       Nurses Notes reviewed and I agree except as noted in the HPI. HISTORY OF PRESENT ILLNESS    Dennie Browner is a 77 y.o. male who presents to the ED for the evaluation of lower back pain and hypotension. Patient has a history of hypertension, CAD, hyperlipidemia, pacemaker placement, CHF, and prostate cancer with metastasis to his bone/spine. He states that he has been experiencing lower back pain for the past week with abdominal pain, fatigue, and diarrhea. Per daughter, the patient has at an appointment in Dr. Gretchen Tom office when his blood pressure was found to be 76/42 and he was then advised to come into the ED for further evaluation. Patient denies experiencing any lightheadedness or dizziness with his hypotension and daughter reports that the blood pressures were taken over his jacket. He reports that he has been taking his prescription Percocet without relief of his pain. He reports that he has been taking his medications as prescribed and that he \"wants to die. \" Patient denies that he has been experiencing any fever, chills, nausea, emesis, weakness, numbness, tingling, incontinence, dysuria, or hematochezia. Daughter reports that the patient received his last radiation treatment on Friday, and that he currently does not see a pain management specialist. She also reports that he has a new oncologist who he has not yet seen. Patient and daughter deny further complaints at initial encounter. PAST MEDICAL HISTORY    has a past medical history of Abnormal heart rhythm; Arthritis; Bleeds easily ([address] Bruises easily; CA of prostate ([address] CAD (coronary artery disease); Circulation problem; Dyspnea due to congestive heart failure ([address] Fatigue; Flash pulmonary edema ([address] Heart palpitations; Hot flashes;  Hyperlipidemia;

## 2018-10-23 PROBLEM — G89.3 CANCER ASSOCIATED PAIN: Status: ACTIVE | Noted: 2018-01-01

## 2018-10-23 PROBLEM — I95.9 HYPOTENSION: Status: ACTIVE | Noted: 2018-01-01

## 2018-10-23 NOTE — PROGRESS NOTES
Pertinent positives as noted in the HPI. All other systems reviewed and negative. PHYSICAL EXAM:    BP (!) 101/49   Pulse 70   Temp 97.7 °F (36.5 °C) (Oral)   Resp 16   Ht 5' 3\" (1.6 m)   Wt 151 lb 4.8 oz (68.6 kg)   SpO2 93%   BMI 26.80 kg/m²     General appearance:  No apparent distress, appears stated age and cooperative. HEENT:  Normal cephalic, atraumatic without obvious deformity. Pupils equal, round, and reactive to light. Extra ocular muscles intact. Conjunctivae/corneas clear. Neck: Supple, with full range of motion. No jugular venous distention. Trachea midline. Respiratory:  Normal respiratory effort. Clear to auscultation, bilaterally without Rales/Wheezes/Rhonchi. Cardiovascular:  Regular rate and rhythm with normal S1/S2 without murmurs, rubs or gallops. Abdomen: Soft, non-tender, non-distended with normal bowel sounds. Musculoskeletal:  No clubbing, cyanosis or edema bilaterally. Full range of motion without deformity. Skin: Skin color, texture, turgor normal.  No rashes or lesions. Neurologic:  Neurovascularly intact without any focal sensory/motor deficits. Cranial nerves: II-XII intact, grossly non-focal.  Psychiatric:  Alert and oriented, thought content appropriate, normal insight  Capillary Refill: Brisk,< 3 seconds   Peripheral Pulses: +2 palpable, equal bilaterally       Labs:     Recent Labs      10/22/18   1800   WBC  5.1   HGB  13.2*   HCT  39.9*   PLT  213     Recent Labs      10/22/18   1800  10/23/18   0639   NA  136  137   K  3.3*  3.8   CL  99  102   CO2  23  20*   BUN  19  20   CREATININE  0.6  0.6   CALCIUM  6.5*  6.2*     Recent Labs      10/22/18   1800   AST  12   ALT  8*   BILIDIR  <0.2   BILITOT  0.7   ALKPHOS  116     No results for input(s): INR in the last 72 hours. No results for input(s): Beadle Gu in the last 72 hours.     Urinalysis:      Lab Results   Component Value Date    NITRU Negative 09/24/2018    WBCUA 0-2 12/09/2015    BACTERIA FEW 12/09/2015    RBCUA 0-2 12/09/2015    BLOODU Trace-lysed 09/24/2018    BLOODU Negative 03/17/2018    SPECGRAV 1.025 03/17/2018    GLUCOSEU Negative 09/24/2018       Intake & Output:  I/O last 3 completed shifts: In: 435 [P.O.:435]  Out: -   No intake/output data recorded. Radiology:   XR CHEST PORTABLE   Final Result      1. Cardiomegaly and bilateral pleural effusions with adjacent consolidation or infiltrate. Correlate for CHF or pneumonia. 2. Probable metastatic disease involving the left proximal humerus and possible right humeral head. **This report has been created using voice recognition software. It may contain minor errors which are inherent in voice recognition technology. **      Final report electronically signed by Dr. Kiesha Patel on 10/22/2018 9:09 PM      CT ABDOMEN PELVIS WO CONTRAST Additional Contrast? None   Final Result      1. Bilateral right greater than left pleural effusions with adjacent consolidation, atelectasis or infiltrate. 2. Multiple retroperitoneal and upper abdominal confluent lymphadenopathy concerning for malignancy. Soft tissue within the retroperitoneum appears to partially encase the aorta and is limited in evaluation without contrast. Consider metastatic disease    versus lymphoma. 3. There is infiltration around the pancreas which could represent pancreatitis correlation with serology is advised. 4. Scattered stool and fluid in the colon. Correlation with symptoms advised. Diverticulosis. Small amount of thickening or infiltration adjacent to the sigmoid colon and rectum concerning for proctocolitis. Infiltrative process cannot be excluded. 5. Extensive bony metastatic disease. **This report has been created using voice recognition software. It may contain minor errors which are inherent in voice recognition technology. **      Final report electronically signed by Dr. Kiesha Patel on 10/22/2018 9:04 PM               DVT prophylaxis:

## 2018-10-23 NOTE — PROGRESS NOTES
Yes  Family / Caregiver Present: No    Subjective: Pt seated in bedside chair upon arrival, initially refusing therapy session, although then requested to use bathroom and go back to bed. Pt agreeable to OT assistance. Comments: RN ok'd session. General:       Vision: Within Functional Limits    Hearing: Within functional limits         Pain:  Pain Assessment  Patient Currently in Pain: Yes  Pain Assessment: 0-10  Pain Level: 8  Pain Type: Chronic pain  Pain Location: Back  Pain Orientation: Lower  Pain Descriptors: Aching  Pain Frequency: Continuous  Clinical Progression: Not changed  Pain Intervention(s): Repositioned; Emotional support; Rest       Social/Functional History:  Lives With: Spouse  Type of Home: House  Home Layout: One level  Home Equipment: Rolling walker             ADL Assistance: Needs assistance  Homemaking Assistance: Needs assistance       Ambulation Assistance: Independent  Transfer Assistance: Independent          Additional Comments: Pt reported always uses RW for mobility. Pt reported wife has \"been helping with everything\" recently. Objective                                              LUE AROM (degrees)  LUE AROM : WFL  LUE General AROM: through observation          RUE AROM (degrees)  RUE AROM : WFL  RUE General AROM: through observation       LUE Strength  Gross LUE Strength: Exceptions to [facility] [place]  LUE Strength Comment: anticipate general deconditioning although not formally tested                RUE Strength  Gross RUE Strength: Exceptions to [facility] [place]  RUE Strength Comment: anticipate general deconditioning although not formally tested                     RUE Tone: Normotonic  LUE Tone: Normotonic    Movements Are Fluid And Coordinated:  Yes               ADL  Grooming: Stand by assistance (washing/drying hands while standing at sink)  Toileting: Contact guard assistance, Moderate assistance (SBA for clothing mgmt down; moderate assist for clothing mgmt up as pt pushed brief down past

## 2018-10-23 NOTE — PROGRESS NOTES
Pt arrived in 5K 20 from ED. Complaints: back pain. IV INT into the hand left, condition patent and no redness. Patient oriented to room. Bed in low position with bed alarm on. Call light within reach. Vitals and assessment complete.

## 2018-10-23 NOTE — CARE COORDINATION
DISCHARGE BARRIERS  10/23/18, 1:21 PM    Reason for Referral: possible home health  Mental Status: alert and oriented  Decision Making: patient is able to make his own decisions. Family/Social/Home Environment: Patient is a 77year old,  male. He states that he and spouse reside in a 2 story home and that bedroom and bathroom are located on the first floor. Patient states that he needs help with his personal care and that he has an aide from [facility] that assists him in this area. SW asked about housekeeping and meals-he states that he and spouse are not able to do these thing for themselves and that their 2 daughters help in these areas. Call placed to daughter Kaleigh-she confirmed that she and sister provide assistance and that patient is seen by a private duty aide from [facility] for personal care, light housekeeping and meal preparation. She states that aide does as much as patient and spouse will allow her to do. He does not have nursing and family would be in agreement with service if patient is agreeable. They would like [facility] to provide skilled services. SW spoke with patient again and he is agreeable to nursing and therapy visits and for [facility] to be the provider. Current Services: as mentioned above. Current Equipment: walker and wheelchair. Payment Source: [employer]  Concerns or Barriers to Discharge: none indicated  Collabrative List of ECF/HH were provided: N/A-patient and family would like [facility]    Teach Back Method used with patient and daughter Lashae Kolb regarding care plan and discharge needs. Patient and daughter verbalize understanding of the plan of care and contribute to goal setting. Anticipated Needs/Discharge Plan: Call to [facility] ([facility])-patient was discharged from their service in May of this year. New referral information provided. Chart information faxed.      Electronically signed by ERVIN Anderson on

## 2018-10-23 NOTE — ED NOTES
Patient initially refuses indwelling catheter. After speaking with the patient and family at bedside he is agreeable to have catheter placed. After setting up for placement patient states he no longer wants it and is refusing. Patient requesting more pain medication. Alicja BOYD notified.      Divine Camacho RN  10/22/18 [id number]

## 2018-10-23 NOTE — PLAN OF CARE
Problem: Pain:  Goal: Pain level will decrease  Pain level will decrease   Outcome: Ongoing  Pt has pain in his lower back, rates pain 10/10 with a goal of 5/10. Pain management saw patient today and made adjustments to medications. Lidoderm patches and dilaudid and percocet this am did not seem to be working for pain since he was still having pain 10/10    Problem: Falls - Risk of:  Goal: Will remain free from falls  Will remain free from falls   Outcome: Ongoing  Call light within reach, bed in lowest position, non skid footwear on, room door open, bed alarm on. Pt very impulsive and will use his call light at times, other times he gets up and alarm goes off    Problem: Respiratory  Goal: No pulmonary complications  Outcome: Ongoing  Lung sounds clear. On room air, denies shortness of breath    Problem: GI  Goal: No bowel complications  Outcome: Ongoing  Pt having diarrhea. Stool sent down to be tested. Stool watery, green/brown in color. Denies nausea, but has no appetite    Problem: Discharge Planning:  Goal: Patients continuum of care needs are met  Patients continuum of care needs are met   Outcome: Ongoing  Pt plans to return home, denies home needs at this time. Comments: Care plan reviewed with patient. Patient verbalize understanding of the plan of care and contribute to goal setting.

## 2018-10-24 PROBLEM — E44.0 MODERATE MALNUTRITION (HCC): Chronic | Status: ACTIVE | Noted: 2018-01-01

## 2018-10-24 PROBLEM — E43 SEVERE MALNUTRITION (HCC): Status: ACTIVE | Noted: 2018-01-01

## 2018-10-24 NOTE — PROGRESS NOTES
Pain Managment   Progress Note      10/24/2018 2:43 PM     · SUBJECTIVE:    · Chief Complaint: Low back pain, cancer pain, bone pain   · Resting in bed curled on his right side. · Continues to have lower back pain, tolerable at rest and increased with movement. · Reports that changes in medications are helping. · Has used 5 doses of prn pain medications in the past 24 hours ( 2 doses of 10 mg and 3 doses of 15 mg)  · Medications effective:  yes  · Pain rating is 4/10 across lower back sharp and deep.    · Constipation: loose     Current medications:    calcium replacement protocol   Other RX Placeholder    magnesium replacement protocol   Other RX Placeholder    potassium replacement protocol   Other RX Placeholder    apixaban  5 mg Oral BID    predniSONE  10 mg Oral Daily    sodium chloride flush  10 mL Intravenous 2 times per day    metoprolol tartrate  50 mg Oral BID    enzalutamide  160 mg Oral Daily    oxyCODONE  20 mg Oral 2 times per day    lidocaine  3 patch Transdermal Daily    potassium chloride  20 mEq Oral BID WC   ·   ·                                    ondansetron, glucose, dextrose, glucagon (rDNA), dextrose, sodium chloride flush, magnesium hydroxide, ondansetron, potassium chloride **OR** potassium chloride **OR** potassium chloride, acetaminophen, oxyCODONE **OR** oxyCODONE, loperamide  ·                                    @[email or web address]        REVIEW OF SYSTEMS:  CONSTITUTIONAL:  positive for  fatigue, malaise, weight loss and depressed mood   EYES:  negative  HEENT:  negative  RESPIRATORY:  negative  CARDIOVASCULAR:  negative  GASTROINTESTINAL:  positive for diarrhea  GENITOURINARY:  positive for prostate cancer   SKIN:  negative  HEMATOLOGIC/LYMPHATIC:  negative  MUSCULOSKELETAL:  positive for  myalgias, arthralgias, pain, muscle weakness and bone pain  NEUROLOGICAL:  negative  BEHAVIOR/PSYCH:  positive for depressed mood  System review otherwise negative      PHYSICAL EXAM:  BP

## 2018-10-24 NOTE — PLAN OF CARE
Problem: Pain:  Goal: Pain level will decrease  Pain level will decrease   Outcome: Ongoing  Pain assessment complete. Will continue to monitor. PRN pain medication given per physician order. Problem: Falls - Risk of:  Goal: Will remain free from falls  Will remain free from falls   Outcome: Ongoing  Call light within reach, bed in lowest position, non skid footwear on, room door open, bed alarm on. Pt very impulsive and will use his call light at times, other times he gets up and alarm goes off    Problem: Respiratory  Goal: No pulmonary complications  Outcome: Ongoing  Lung sounds clear. On room air, denies shortness of breath    Problem: GI  Goal: No bowel complications  Outcome: Ongoing  PRN Imodium given PRN with loose stools. Problem: Nutrition  Goal: Optimal nutrition therapy  Outcome: Ongoing  Poor appetite. Problem: Discharge Planning:  Goal: Patients continuum of care needs are met  Patients continuum of care needs are met   Outcome: Ongoing      Problem: Risk for Impaired Skin Integrity  Goal: Tissue integrity - skin and mucous membranes  Structural intactness and normal physiological function of skin and  mucous membranes. Outcome: Ongoing  No new skin issues noted this shift. Comments: Care plan reviewed with patient. Patient verbalize understanding of the plan of care and contribute to goal setting.

## 2018-10-24 NOTE — PROGRESS NOTES
Palliative care to unit to check on pt. Pain management saw yesterday and ordered new regimen, including oxycontin and oxyIR. Pt is on prednisone, also. Writer to pt's room, door is closed and room is dark, curtain is pulled. Writer did not disturb. Discussed with pt's primary nurse,TROY Pedroza- she reports that pt has better pain control but has diarrhea. No needs from palliative care at this time. Pt is still on observation status, unknown if will be discharged today, physician has not rounded. Palliative care will remain available, floor to notify PRN if needs arise.

## 2018-10-24 NOTE — PROGRESS NOTES
limited by fatigue;Patient limited by endurance    Treatment Initiated: None    Assessment: Body structures, Functions, Activity limitations: Decreased functional mobility , Decreased balance, Decreased endurance, Decreased strength  Assessment: Pt tolerates session fair, limited by pain in low back and impaired endurance, c/o SOB after gait. PT to continue to progress strength and functional mobility to return to PLOF. Prognosis: Good    Clinical Presentation: Low - Stable and Uncomplicated: Pt tolerates session fair, limited by pain in low back and impaired endurance, c/o SOB after gait. PT to continue to progress strength and functional mobility to return to PLOF. Decision Making: High Complexitybased on patient assessment and decision making process of determining plan of care and establishing reasonable expectations for measurable functional outcomes    REQUIRES PT FOLLOW UP: Yes    Discharge Recommendations:  Discharge Recommendations: Continue to assess pending progress, Home with Home health PT    Patient Education:  Patient Education: POC    Equipment Recommendations:  Equipment Needed: No    Safety:  Type of devices: All fall risk precautions in place, Bed alarm in place, Call light within reach, Left in bed, Patient at risk for falls, Gait belt  Restraints  Initially in place: No    Plan:  Times per week: 3-5 X GM  Times per day: Daily  Current Treatment Recommendations: Strengthening, Functional Mobility Training, Transfer Training, Balance Training, Endurance Training, Stair training, Gait Training, Safety Education & Training, Patient/Caregiver Education & Training, Equipment Evaluation, Education, & procurement, Home Exercise Program    Goals:  Patient goals : To feel better. Short term goals  Time Frame for Short term goals: 2 weeks  Short term goal 1: Pt to transfer supine <--> sit SBA to enable pt to get in/out of bed.   Short term goal 2: Pt to transfer sit <--> stand SBA for increased

## 2018-10-24 NOTE — PLAN OF CARE
Problem: Nutrition  Goal: Optimal nutrition therapy  Outcome: Ongoing  Nutrition Problem: Moderate malnutrition, in context of chronic illness  Intervention: Food and/or Nutrient Delivery: Continue current diet, Start ONS, Vitamin Supplement (Recommend a Multivitamin w/minerals daily.  Started Magic Cups TID. )  Nutritional Goals: Pt will consume 75% or more of meals at best effort during LOS

## 2018-10-24 NOTE — PROGRESS NOTES
progress       Patient:  Arleth Hunter  YOB: 1940    MRN: [id number]     Acct: [de-identified]    PCP: Roxana Hector DO    Date of Admission: 10/22/2018    Date of Service: Pt seen/examined on 10/24/18  and Admitted to Observation with expected LOS less than two midnights due to medical therapy. Chief Complaint:  Sent by urology office for low blood pressure      History Of Present Illness:    77 y.o. male with medical history significant for prostate cancer with metastasis to bone, hypertension, hyperlipidemia; who presented to [address] [place] [address] after being found to be hypotensive at urology office. Patient went for follow-up at urology office and was found to have a systolic blood pressure in the 70s. Patient was instructed to go to the ER. In the ER, patient's blood pressure has been in the 274A systolic. Patient reports that he's had excruciating back pain. Patient reports that 4 days prior to admission he had radiation treatment to help with his pain. Patient states that this has not helped. Patient also reports that pain medication given in the ED has also not been helpful. Patient is hoping for some pain relief. No fever, no chills, no nausea, no vomiting, no URI or UTI type symptoms.     Subjective-    Having SEVERE diarrhea  Weak  Scheduled Meds:   calcium replacement protocol   Other RX Placeholder    magnesium replacement protocol   Other RX Placeholder    potassium replacement protocol   Other RX Placeholder    apixaban  5 mg Oral BID    predniSONE  10 mg Oral Daily    sodium chloride flush  10 mL Intravenous 2 times per day    metoprolol tartrate  50 mg Oral BID    enzalutamide  160 mg Oral Daily    oxyCODONE  20 mg Oral 2 times per day    lidocaine  3 patch Transdermal Daily    potassium chloride  20 mEq Oral BID WC     Continuous Infusions:   dextrose       PRN Meds:.ondansetron, glucose, dextrose, glucagon (rDNA), dextrose, sodium chloride

## 2018-10-24 NOTE — TELEPHONE ENCOUNTER
[facility],    I talked to the daughter, Nicholas Li, and she states the patient already has an oncologist. I see in his chart he saw Dr. Jessica Fuchs on 10/01/2018. Dr. Jessica Fuchs is currently out of town. The family is wanting to know if the patient can stop taking the [employer] because it is making him very sick. Please advise, thank you.

## 2018-10-25 PROBLEM — K52.9 GASTROENTERITIS: Status: ACTIVE | Noted: 2018-01-01

## 2018-10-25 NOTE — PROGRESS NOTES
SYSTEMS:  CONSTITUTIONAL:  positive for  fatigue, malaise, weight loss and depressed mood, abdominal pain     EYES:  negative  HEENT:  negative  RESPIRATORY:  negative  CARDIOVASCULAR:  negative  GASTROINTESTINAL:  positive abdominal pain, distention   GENITOURINARY:  positive for prostate cancer   SKIN:  negative  HEMATOLOGIC/LYMPHATIC:  negative  MUSCULOSKELETAL:  positive for  myalgias, arthralgias, pain, muscle weakness and bone pain  NEUROLOGICAL:  negative  BEHAVIOR/PSYCH:  positive for depressed mood  System review otherwise negative      PHYSICAL EXAM:  BP (!) 134/58   Pulse 71   Temp 98.7 °F (37.1 °C) (Oral)   Resp 19   Ht 5' 3\" (1.6 m)   Wt 150 lb 11.2 oz (68.4 kg)   SpO2 95%   BMI 26.70 kg/m²  I Body mass index is 26.7 kg/m².  I   Wt Readings from Last 1 Encounters:   10/24/18 150 lb 11.2 oz (68.4 kg)      Awake  Orientation:   person, place, time  Mood: depressed   Affect: depressed and calm  General appearance: Patient is well nourished, well developed, well groomed and in no acute distress, uncomfortable and guarded with pain      Memory: decreased thought processing   Attention/Concentration: normal  Language:  normal     Cranial Nerves:  cranial nerves II-XII are grossly intact  ROM:  Normal, decreased ROM across lower back   Motor Exam:  Motor exam is symmetrical 4 out of 5 all extremities bilaterally  Tone:  normal  Muscle bulk: within normal limits  Sensory:  Sensory intact  Coordination:   normal     Heart: irregularly irregular rhythm, no murmurs, rubs, clicks or gallops  Lungs: clear to auscultation without wheezes or rales  Abdomen: distended, rounded, tender, normal bowel sounds, no masses or organomegaly      Skin: warm and dry, no rash or erythema  Peripheral vascular: Pulses: Normal upper and lower extremity pulses; Edema: no    DATA    Recent Labs      10/22/18   1800   WBC  5.1   RBC  3.99*   HGB  13.2*   HCT  39.9*   MCV  100.0*   MCH  33.1*   MCHC  33.1   PLT  213   MPV  10.7

## 2018-10-25 NOTE — PROGRESS NOTES
assistance  Ambulation Assistance: Independent  Transfer Assistance: Independent  Additional Comments: Pt reported always uses RW for mobility. Pt reported wife has \"been helping with everything\" recently. Subjective       Subjective: Pt supine in bed upon arrival, agreeable to OT with encouragement and requesting to use bathroom. Pt demoed slight frustration at end of session reporting \"I wanted to walk farther with you, but I'm just too exhausted now. \"   Comments: RN ok'd session. Overall Orientation Status: Within Functional Limits         Pain:  Pain Assessment  Patient Currently in Pain: Yes  Pain Assessment: 0-10  Pain Level: 6  Pain Type: Chronic pain  Pain Location: Generalized  Pain Descriptors: Aching;Constant  Pain Frequency: Continuous  Clinical Progression: Gradually improving  Pain Intervention(s): Repositioned; Ambulation/Increased activity; Emotional support; Rest  Pre Treatment Pain Screening  Intervention List: Patient able to continue with treatment    Objective  Overall Cognitive Status: WFL    Perception  Overall Perceptual Status: WFL                                                              ADL  Grooming: Minimal assistance (washing/drying hands while standing at sink; assist to retrieve paper towels due to fatigue; forearms supported on countertop due to fatigue despite cues for upright posture)  LE Dressing: Maximum assistance (doffing/donning depends; required assist to thread over feet and pull up over hips)  Toileting: Minimal assistance (minimal assist for clothing mgmt down and min assist for thoroughness with wiping)  Additional Comments: Pt required encouragement to attempmt completing ADLs on own stating \"I need help with everything right now. \"           Bed mobility  Supine to Sit: Contact guard assistance  Sit to Supine: Minimal assistance (for BLE)  Scooting: Stand by assistance (advancing hips forward to EOB; increased time to complete)    Transfers  Sit to stand: Stand

## 2018-10-25 NOTE — CONSULTS
recommendations. ? If pt would benefit from [place]. Facilitate ARV-7 testing outpt. ? [place]. Pt verbalizes he would never want chemotherapy. He has classically had issues with tolerating the hot flashes and side effects from ADT therapy and we discussed today that if he no longer wishes to pursue active treatment we may need to consider hospice. Jackson Royal reports at this time he wishes to continue Xgeva, Lupron injections. Romulo Mckeon is currently on hold secondary to diarrhea. Last dose 10/24/18. Pt was noted to have possible proctocolitis findings on CT 10/22/18--further eval per hospitalist.  ? Radiation induced? ? Infection contributing to his diarrhea. Ok to [employer] for now. If symptoms resolve we may consider cautiously re-introducing in a week or two and see if symptoms resume. Pt is noting extreme nausea and abdominal pain. Check lipase with finding of possible pancreatitis on CT imaging 10/22. Check KUB. ? Pt's prostate cancer contributing to pain.     Thank you for including us in the care of [facility], APRN-CNP  10/25/18 11:51 AM  Urology

## 2018-10-25 NOTE — PROGRESS NOTES
proctocolitis. Infiltrative process cannot be excluded. 5. Extensive bony metastatic disease. **This report has been created using voice recognition software. It may contain minor errors which are inherent in voice recognition technology. **      Final report electronically signed by Dr. Corinna Granados on 10/22/2018 9:04 PM      XR ABDOMEN (KUB) (SINGLE AP VIEW)    (Results Pending)            DVT prophylaxis: [] Lovenox                                 [] SCDs                                 [] SQ Heparin                                 [] Encourage ambulation           [x] Already on Anticoagulation    Code Status: DNR-CCA    PT/OT Eval Status: Ordered    Disposition:    [x] Home       [] TCU       [] Rehab       [] Psych       [] SNF       [] [place]       [] Other-    ASSESSMENT:    [address] Problems    Diagnosis Date Noted    Gastroenteritis [K52.9] 10/25/2018    Moderate malnutrition ([address] [E44.0] 10/24/2018     Class: Chronic    Hypotension [I95.9] 10/23/2018    Acute on chronic congestive heart failure ([address] [I50.9]     Cancer associated pain [G89.3] 10/22/2018    Bone metastasis ([address] [C79.51] 10/03/2018    Essential hypertension [I10]     Chronic atrial fibrillation ([address] [I48.2] 12/10/2015    Hyperlipidemia [E78.5]     Physical deconditioning [R53.81]     Prostate cancer [C61] 06/08/2012       PLAN:    1. Hypotension- Responded with fluid resolved  2. Intractable back pain-patient reports that he has had back pain for some time. Patient has prostate cancer with metastasis to his back. Patient states that back pain has become unbearable in the past week. Patient states that his Percocet at home was not working. Patient also states that Dilaudid given in the ED has not been helpful either. Patient states that radiation he received 4 days ago has also not helped with his pain. Patient will be continued on Percocet and will be given Dilaudid.   We will also give

## 2018-10-25 NOTE — TELEPHONE ENCOUNTER
I called and spoke with [facility] and advised her of [facility] message if the [place] is stopped it will likely increase his PSA quicker.  I also advised her to make an appointment with this office and Dr Isis Menon after he is discharged from the hospital.

## 2018-10-26 NOTE — PROGRESS NOTES
Nutrition Assessment    Type and Reason for Visit: Reassess (Severe Malnutrition)    Nutrition Recommendations:   Encourage po intake as able. Continue nutritional supplements for now. Medical Status is guarded. Poor Prognosis. Hospice? Malnutrition Assessment:  · Malnutrition Status: Meets the criteria for moderate malnutrition  · Context: Chronic illness  · Findings of the 6 clinical characteristics of malnutrition (Minimum of 2 out of 6 clinical characteristics is required to make the diagnosis of moderate or severe Protein Calorie Malnutrition based on AND/ASPEN Guidelines):  1. Energy Intake-Less than or equal to 50%, greater than or equal to 1 month    2. Weight Loss- (-10.1% weight loss), in 3 months (+edema noted & hx CHF)  3. Fat Loss-Unable to assess,    4. Muscle Loss-Mild muscle mass loss, Temples (temporalis muscle), Thigh (quadriceps)  5. Fluid Accumulation-Mild fluid accumulation, Generalized  6.  Strength-Not measured    Nutrition Diagnosis:   · Problem: Moderate malnutrition, in context of chronic illness  · Etiology: related to Insufficient energy/nutrient consumption     Signs and symptoms:  as evidenced by Patient report of (consuming less than 50% of energy intake over the past month; Mild Muscle Loss)    Nutrition Assessment:  · Subjective Assessment: Pt admitted with low blood pressure and back pain. Pt with hx prostate cancer with mets to bone. + Abdominal pain/N/V - Ileus. NGT placed. Poor prognosis. Pt does not want anymore chemo/radiation per chart note. Pt seen. Appears tired and weak. Does not have any nutritional requests/concerns. Hospice consult pending. RD to follow and remain available.    · Nutrition-Focused Physical Findings:    · Wound Type: Skin Tears (on right hand)  · Current Nutrition Therapies:  · Oral Diet Orders: General   · Oral Diet intake: 1-25%  · Oral Nutrition Supplement (ONS) Orders: Frozen Oral Supplement (Magic Cup TID - Variety per pt

## 2018-10-26 NOTE — PROGRESS NOTES
Dr Stephanie Arora on unit and informed of patient's abdominal distention and absence of bowel sounds on right side and that we await KUB report from earlier in day. Reviews image of KUB and orders received.

## 2018-10-26 NOTE — CONSULTS
with pacemaker, HLP, CHF, prostate cancer with mets to bone who we are consulted by Dr. Maddison Zarate for evaluation of abdominal pain, nausea, and vomiting with imaging concerning for ileus. Likely has ileus secondary to narcotics vs constipation vs less likely gastroenteritis. - supportive care per primary team  - continue NG tube to LIS  - continue to monitor electrolytes, replete as needed, Mg, Phos, K  - will trial one dose of Relistor to see if this provides any symptomatic relief, can repeat as needed  - would avoid narcotics as able  - get patient OOB to chair      Thank you for allowing us to participate in the care of this patient. Feel free to contact [facility] or myself with any questions or concerns. GI will follow along with you.       Wil Shaffer MD   Gastroenterology - [facility]

## 2018-10-26 NOTE — PROGRESS NOTES
vs partial SBO  Nausea/vomiting  Diarrhea--improved  Possible proctocolitis on CT  Infiltration around the pancreatitis  RP and upper abdominal lymphadenopathy  Bilateral right greater than left pleural effusions  Physical deconditioning  HTN, Chronic afib    Plan: We will add pt on for discussion at next cancer conference. Ok to hold Jason until GI concerns improved. Pt will need appt in the office with Dr. Susan Shaw within a week of d/c for Xgeva and Lupron injections. Consider ARV-7 testing. Pt is having emesis now and abdomen distended with increased pain. Nursing to page hospitalist for further evaluation.       Temo Espitia, APRN-CNP  10/26/18 7:10 AM  Urology

## 2018-10-26 NOTE — PLAN OF CARE
Problem: Pain:  Goal: Pain level will decrease  Pain level will decrease   Outcome: Ongoing  Patient states pain relief from PRN pain medications. Pain reassessed one hour post PRN pain medication given. Patient rates pain 7 on JESS 0-10 scale. Problem: Falls - Risk of:  Goal: Will remain free from falls  Will remain free from falls   Outcome: Met This Shift  Fall assessment completed. Patient using call light appropriately to call for assistance with ambulation to bathroom. Personal items within reach. Patient is also compliant with use of non-skid slippers. Problem: Respiratory  Goal: No pulmonary complications  Outcome: Met This Shift  Patients oxygen saturation 96 % on room air. No shortness of breath noted. Lung sounds clear, able C&DB as ordered. Problem: GI  Goal: No bowel complications  Outcome: Ongoing  Patient bowel sounds hypoactive. Passing flatus. Pt taking prescribed medication to assist with BM. Patient has NG- tube in. Problem: Nutrition  Goal: Optimal nutrition therapy  Outcome: Ongoing  Patient NPO. Problem: Discharge Planning:  Goal: Patients continuum of care needs are met  Patients continuum of care needs are met   Outcome: Ongoing  Discharge plan is in process. Plan discharge home with family. Problem: Risk for Impaired Skin Integrity  Goal: Tissue integrity - skin and mucous membranes  Structural intactness and normal physiological function of skin and  mucous membranes. Outcome: Ongoing  No skin breakdown this shift. Patient being assisted with turning. Patients states understanding of repositioning every two hours. Comments: Care plan reviewed with patient . Patient  verbalize understanding of the plan of care and contribute to goal setting.

## 2018-10-26 NOTE — PROGRESS NOTES
hypertension [I10]     Chronic atrial fibrillation ([facility] [I48.2] 12/10/2015    Hyperlipidemia [E78.5]     Physical deconditioning [R53.81]     Prostate cancer [C61] 06/08/2012       PLAN:    1. Hypotension- Responded with fluid resolved  2. Ileus vs SBO- is on many narcotics sec to metastatic cancer related pain. Also received immodium for severe diarrhea- Insert NG to LIWS- consulted GI-  3. Intractable back pain-patient reports that he has had back pain for some time. Patient has prostate cancer with metastasis to his back. Patient states that back pain has become unbearable in the past week. Patient states that his Percocet at home was not working. Patient also states that Dilaudid given in the ED has not been helpful either. Patient states that radiation he received 4 days ago has also not helped with his pain. Patient will be continued on Percocet and will be given Dilaudid. We will also give patient Lidoderm patches. Pain management following  4. Bone metastasis-patient has history of prostate cancer with metastasis to his bone. Patient underwent radiation treatment 4 days prior to admission for bone pain. Patient states that radiation has not been helpful. Patient reports that he has only undergone one round of radiation so far. Patient does not believe that he is scheduled for any further radiation treatment. We will get a pain management consult to assist in patient's intractable back pain from bone metastasis. 5. Acute gastroenteritis- we will send a GI panel- continue IV fluid- likely secondary to chemo- oncology signed off as the pt follows with urology for his chemo- spoke to urology - recommends hospice  6. Physical deconditioning-patient reports that his back pain is so severe he has been unable to move around. Patient states that he feels weak.   We will get PT/OT evaluation while patient is in the hospital.  7. Prostate cancer-patient has undergone chemo and radiation for prostate

## 2018-10-27 NOTE — PROGRESS NOTES
Hospice referral in patient room with his wife Pedrito Ron and 2 daughters Leonidas Franco and Juno. Patient alert but fell asleep often with nurse visit. Pt able to rest when not being touched or moved. When moved or reposition pain noted to be 10. Hospice concepts, philosophies, and services explained. Patient wishes only to go home. Discussed with them that certainly can work towards that plan but will need to work on patient pain management and that at some point pt may qualify for level of care offered in hospital. Do not wish for at this time. Voiced understanding but wish to attempt to get him home since this is what he wants. Family would wish for [facility] since this is where the consult for [facility] was arranged. Call placed to [facility] and discussed patient wishes and part of his condition with [facility]. She voiced that needs order for hospice referral. Order printed off and faxed to [facility] office. Primary nurse Ondina's phone number given for hospice to call for more information. Primary nurse updated. [facility] hospice will remain available for questions/concerns, continued changes, or if would wish for GIP evaluation.

## 2018-10-27 NOTE — PROGRESS NOTES
data filed at 10/27/18 0526   Gross per 24 hour   Intake           1883.9 ml   Output             3850 ml   Net          -1966.1 ml       Diet:  Diet NPO Time Specified    Exam:  /61   Pulse 71   Temp 98.7 °F (37.1 °C) (Oral)   Resp 22   Ht 5' 3\" (1.6 m)   Wt 150 lb 11.2 oz (68.4 kg)   SpO2 91%   BMI 26.70 kg/m²     General appearance: chronically ill appearing. HEENT: Pupils equal, round, and reactive to light. Conjunctivae/corneas clear. Neck: Supple, with full range of motion. No jugular venous distention. Trachea midline. Respiratory:  Normal respiratory effort. Clear to auscultation, bilaterally without Rales/Wheezes/Rhonchi. Cardiovascular: Regular rate and rhythm with normal S1/S2 without murmurs, rubs or gallops. Abdomen: Soft, non-tender, non-distended with normal bowel sounds. Musculoskeletal: passive and active ROM x 4 extremities. Skin: Skin color, texture, turgor normal.  No rashes or lesions. Neurologic:  Neurovascularly intact without any focal sensory/motor deficits. Cranial nerves: II-XII intact, grossly non-focal.  Psychiatric: Alert and oriented, thought content appropriate, normal insight  Capillary Refill: Brisk,< 3 seconds   Peripheral Pulses: +2 palpable, equal bilaterally       Labs:   No results for input(s): WBC, HGB, HCT, PLT in the last 72 hours. Recent Labs      10/25/18   0638  10/26/18   0638  10/27/18   0627   NA  133*  132*  137   K  4.1  4.6  4.6   CL  101  99  99   CO2  19*  20*  22*   BUN  18  20  24*   CREATININE  0.6  0.7  0.9   CALCIUM  6.5*  7.2*  6.9*   PHOS   --   2.5   --      No results for input(s): AST, ALT, BILIDIR, BILITOT, ALKPHOS in the last 72 hours. No results for input(s): INR in the last 72 hours. No results for input(s): [place] Fly in the last 72 hours.     Urinalysis:    Lab Results   Component Value Date    NITRU Negative 09/24/2018    WBCUA 0-2 12/09/2015    BACTERIA FEW 12/09/2015    RBCUA 0-2 12/09/2015    BLOODU Trace-lysed There is infiltration around the pancreas which could represent pancreatitis correlation with serology is advised. 4. Scattered stool and fluid in the colon. Correlation with symptoms advised. Diverticulosis. Small amount of thickening or infiltration adjacent to the sigmoid colon and rectum concerning for proctocolitis. Infiltrative process cannot be excluded. 5. Extensive bony metastatic disease. **This report has been created using voice recognition software. It may contain minor errors which are inherent in voice recognition technology. **      Final report electronically signed by Dr. Kanu Shabazz on 10/22/2018 9:04 PM          Diet: Diet NPO Time Specified       Disposition: home hospice     Code Status: DNR-CC    Assessment/Plan:    Anticipated Discharge in : 10/29    Active Hospital Problems    Diagnosis Date Noted    Gastroenteritis [K52.9] 10/25/2018    Moderate malnutrition ([address] [E44.0] 10/24/2018     Class: Chronic    Hypotension [I95.9] 10/23/2018    Acute on chronic congestive heart failure (HCC) [I50.9]     Cancer associated pain [G89.3] 10/22/2018    Bone metastasis ([address] [C79.51] 10/03/2018    Essential hypertension [I10]     Chronic atrial fibrillation ([address] [I48.2] 12/10/2015    Hyperlipidemia [E78.5]     Physical deconditioning [R53.81]     Prostate cancer [C61] 06/08/2012       Assessment:  1. Home Hospice Care  2. Intractable back pain from cancer bone mets   3. Stage 4 Prostate Cancer  4. Ileus   5. Physical Decondition   6. Chronic A. Fib   7. Essential HTN     Plan:  1. Patient wanting home hospice. Reports being tired of being in pain and in the hospital. Pt aware of the extent of his cancer. Will consult hospice. Made pt DNR comfort care. IV Dilaudid and Ativan prn.   2. Intractable back pain 2/2 to malignancy. IV dilaudid, lidocain patches, IV ativan. Consult Hospice. 3. Stage 4 Prostate Cancer with spread to bones. Pain control. Consult hospice  4.  Nausea,

## 2018-10-27 NOTE — CONSULTS
10/26/18   0638  10/27/18   0627   NA  133*  132*  137   K  4.1  4.6  4.6   CL  101  99  99   CO2  19*  20*  22*   BUN  18  20  24*   CREATININE  0.6  0.7  0.9   GLUCOSE  102  124*  109*   MG  1.9  1.9  2.0   PHOS   --   2.5   --    CALCIUM  6.5*  7.2*  6.9*     LFT  Recent Labs      10/25/18   0638   LIPASE  7.6     INR  No results for input(s): INR, PROTIME in the last 72 hours. PTT  No results for input(s): APTT in the last 72 hours. Radiology        Ct Abdomen Pelvis Wo Contrast Additional Contrast? None    Result Date: 10/22/2018  PROCEDURE: CT ABDOMEN PELVIS WO CONTRAST CLINICAL INFORMATION: third spacing . Known history of prostate cancer. COMPARISON: 12/9/2015 and 8/25/2016 TECHNIQUE: Axial 5 mm CT images were obtained through the abdomen and pelvis. No contrast was given. Coronal reconstructions were obtained. All CT scans at this facility use dose modulation, iterative reconstruction, and/or weight-based dosing when appropriate to reduce radiation dose to as low as reasonably achievable. FINDINGS: Limitations: Evaluation of the solid and hollow viscera is limited due to the lack of IV contrast. Bilateral right greater than left pleural effusions with adjacent consolidation, atelectasis or infiltrate. Nodular hepatic contour suggesting cirrhosis. Multiple retroperitoneal and upper abdominal confluent lymphadenopathy concerning for malignancy. Soft tissue within the retroperitoneum appears to partially encase the aorta and is limited in evaluation without contrast. Consider metastatic disease versus lymphoma. There is infiltration around the pancreas which could represent pancreatitis correlation with serology is advised. Duodenal diverticuli. Cholecystectomy. Bilateral renal cortical thinning. There is a complex partially exophytic right renal lower pole cyst measuring 4.7 x 4.9 cm with irregular peripheral calcifications. Splenic calcifications. Scattered stool and fluid in the colon.  Correlation with lower abdomen especially within the left lower quadrant. Findings may be related to a partial small bowel obstruction or ileus. There is gas distally within the rectum. Posterior fusion hardware seen overlying the lumbar sacral spine. Surgical clips are seen overlying the right upper quadrant. 1. Multiple gas-filled distended bowel loops are seen overlying the lower abdomen, especially over the left lower quadrant. Findings may be related to ileus versus partial small bowel obstruction. Recommend continued follow-up. **This report has been created using voice recognition software. It may contain minor errors which are inherent in voice recognition technology. ** Final report electronically signed by Dr. Daren Win on 10/26/2018 2:02 AM    Xr Chest Portable    Result Date: 10/22/2018  PROCEDURE: XR CHEST PORTABLE CLINICAL INFORMATION: 3rd spacing, . COMPARISON: December 15, 2015 TECHNIQUE: Single view chest. FINDINGS: Permanent left chest wall pacer. Heterogeneous appearance of the left proximal humerus in patient with known metastatic prostate cancer. Bilateral right greater than left pleural effusion and cardiomegaly. Dependent atelectasis or infiltrate. Atherosclerotic changes aortic arch. 1. Cardiomegaly and bilateral pleural effusions with adjacent consolidation or infiltrate. Correlate for CHF or pneumonia. 2. Probable metastatic disease involving the left proximal humerus and possible right humeral head. **This report has been created using voice recognition software. It may contain minor errors which are inherent in voice recognition technology. ** Final report electronically signed by Dr. Bladimir Dyson on 10/22/2018 9:09 PM    Xr Abdomen G Tube Placement    Result Date: 10/26/2018  PROCEDURE: XR ABDOMEN G TUBE PLACEMENT CLINICAL INFORMATION: checK for NG tube placement., COMPARISON: No prior study. TECHNIQUE: A single mobile image of the abdomen was obtained to evaluate NG tube position.  FINDINGS: The

## 2018-10-27 NOTE — PROGRESS NOTES
Hospital Follow Up Note  EFREN   S  No n/v  Ng in place     Current Facility-Administered Medications   Medication Dose Route Frequency Provider Last Rate Last Dose    HYDROmorphone (DILAUDID) injection 1 mg  1 mg Intravenous Q3H PRN Gerry Noonan MD   1 mg at 10/27/18 1101    LORazepam (ATIVAN) injection 1 mg  1 mg Intravenous Q6H PRN Gerry Noonan MD        promethazine (PHENERGAN) injection 6.25 mg  6.25 mg Intramuscular Q6H PRN Cannon Crigler, MD   6.25 mg at 10/26/18 0511    oxyCODONE (OXYCONTIN) extended release tablet 40 mg  40 mg Oral 2 times per day Eliel Media, APRN - CNP   40 mg at 10/27/18 0852    0.9 % sodium chloride infusion   Intravenous Continuous Hannah Sands  mL/hr at 10/27/18 1059      ondansetron (ZOFRAN-ODT) disintegrating tablet 4 mg  4 mg Oral Q8H PRN Cannon Crigler, MD   4 mg at 10/25/18 1923    melatonin tablet 3 mg  3 mg Oral Nightly PRN Stone Cruz MD   3 mg at 10/26/18 2242    predniSONE (DELTASONE) tablet 10 mg  10 mg Oral Daily Jaret Lynch MD   10 mg at 10/27/18 0854    sodium chloride flush 0.9 % injection 10 mL  10 mL Intravenous 2 times per day Jaret Lynch MD   10 mL at 10/25/18 2304    sodium chloride flush 0.9 % injection 10 mL  10 mL Intravenous PRN Jaret Lynch MD        magnesium hydroxide (MILK OF MAGNESIA) 400 MG/5ML suspension 30 mL  30 mL Oral Daily PRN Jaret Lynch MD        on[facility]etron [facility]) injection 4 mg  4 mg Intravenous Q6H PRN Jaret Lynch MD   4 mg at 10/26/18 0114    potassium chloride (KLOR-CON M) extended release tablet 40 mEq  40 mEq Oral PRN Jaret Lynch MD        Or    potassium chloride 20 MEQ/15ML (10%) oral solution 40 mEq  40 mEq Oral PRN Jaret Lynch MD        Or    potassium chloride 10 mEq/100 mL IVPB (Peripheral Line)  10 mEq Intravenous PRN Jaret Lynch MD        acetaminophen (TYLENOL) tablet 650 mg  650 mg Oral Q4H PRN [place]

## 2018-10-28 NOTE — PROGRESS NOTES
Gross per 24 hour   Intake          1885.32 ml   Output             1300 ml   Net           585.32 ml       Diet:  Diet NPO Time Specified    Exam:  /62   Pulse 70   Temp 97.6 °F (36.4 °C) (Oral)   Resp 20   Ht 5' 3\" (1.6 m)   Wt 150 lb 11.2 oz (68.4 kg)   SpO2 94%   BMI 26.70 kg/m²     General appearance: No apparent distress, appears stated age and cooperative. HEENT: Pupils equal, round, and reactive to light. Conjunctivae/corneas clear. Neck: Supple, with full range of motion. No jugular venous distention. Trachea midline. Respiratory:  Normal respiratory effort. Clear to auscultation, bilaterally without Rales/Wheezes/Rhonchi. Cardiovascular: Regular rate and rhythm with normal S1/S2 without murmurs, rubs or gallops. Abdomen: Soft, non-tender, non-distended with normal bowel sounds. Musculoskeletal: passive and active ROM x 4 extremities. Skin: Skin color, texture, turgor normal.  No rashes or lesions. Neurologic:  Neurovascularly intact without any focal sensory/motor deficits. Cranial nerves: II-XII intact, grossly non-focal.  Psychiatric: Alert and oriented, thought content appropriate, normal insight  Capillary Refill: Brisk,< 3 seconds   Peripheral Pulses: +2 palpable, equal bilaterally       Labs:   No results for input(s): WBC, HGB, HCT, PLT in the last 72 hours. Recent Labs      10/26/18   0638  10/27/18   0627   NA  132*  137   K  4.6  4.6   CL  99  99   CO2  20*  22*   BUN  20  24*   CREATININE  0.7  0.9   CALCIUM  7.2*  6.9*   PHOS  2.5   --      No results for input(s): AST, ALT, BILIDIR, BILITOT, ALKPHOS in the last 72 hours. No results for input(s): INR in the last 72 hours. No results for input(s): [place] in the last 72 hours.     Urinalysis:    Lab Results   Component Value Date    NITRU Negative 09/24/2018    WBCUA 0-2 12/09/2015    BACTERIA FEW 12/09/2015    RBCUA 0-2 12/09/2015    BLOODU Trace-lysed 09/24/2018    BLOODU Negative 03/17/2018    SPECGRAV 1.025

## 2018-10-29 NOTE — PROGRESS NOTES
tube tip is in the stomach. **This report has been created using voice recognition software. It may contain minor errors which are inherent in voice recognition technology. **      Final report electronically signed by Dr. Sidney Sotomayor on 10/26/2018 11:20 AM      XR ABDOMEN (KUB) (SINGLE AP VIEW)   Final Result   1. Multiple gas-filled distended bowel loops are seen overlying the lower abdomen, especially over the left lower quadrant. Findings may be related to ileus versus partial small bowel obstruction. Recommend continued follow-up. **This report has been created using voice recognition software. It may contain minor errors which are inherent in voice recognition technology. **      Final report electronically signed by Dr. Grayson Piper on 10/26/2018 2:02 AM      XR CHEST PORTABLE   Final Result      1. Cardiomegaly and bilateral pleural effusions with adjacent consolidation or infiltrate. Correlate for CHF or pneumonia. 2. Probable metastatic disease involving the left proximal humerus and possible right humeral head. **This report has been created using voice recognition software. It may contain minor errors which are inherent in voice recognition technology. **      Final report electronically signed by Dr. Sonia Herzog on 10/22/2018 9:09 PM      CT ABDOMEN PELVIS WO CONTRAST Additional Contrast? None   Final Result      1. Bilateral right greater than left pleural effusions with adjacent consolidation, atelectasis or infiltrate. 2. Multiple retroperitoneal and upper abdominal confluent lymphadenopathy concerning for malignancy. Soft tissue within the retroperitoneum appears to partially encase the aorta and is limited in evaluation without contrast. Consider metastatic disease    versus lymphoma. 3. There is infiltration around the pancreas which could represent pancreatitis correlation with serology is advised. 4. Scattered stool and fluid in the colon.

## 2018-11-06 NOTE — DISCHARGE SUMMARY
Hospital Medicine Discharge Summary      Patient Identification:   Anam Zamora   : 1940  MRN: [id number]   Account: [de-identified]      Patient's PCP: Judson Cardona DO    Admit Date: 10/22/2018     Discharge Date: 10/30/2018     Admitting Physician: Stephanie Barahona MD     Discharge Physician: Johnnie Bowen MD     Discharge Diagnoses: Hospice, Prostate Cancer with [address] Problems    Diagnosis Date Noted    Gastroenteritis [K52.9] 10/25/2018    Moderate malnutrition ([address] [E44.0] 10/24/2018     Class: Chronic    Hypotension [I95.9] 10/23/2018    Acute on chronic congestive heart failure ([address] [I50.9]     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 Hospital Course:   77 y. o. male with medical history significant for prostate cancer with metastasis to bone, hypertension, hyperlipidemia; who presented to [address] [place] [address] after being found to be hypotensive at urology office. Alex Argueta went for follow-up at urology office and was found to have a systolic blood pressure in the 70s.  Patient was instructed to go to the ER.  In the ER, patient's blood pressure has been in the 767G systolic.  Patient reports that he's had excruciating back pain.  Patient reports that 4 days prior to admission he had radiation treatment to help with his pain.  Patient states that this has not helped.  Patient also reports that pain medication given in the ED has also not been helpful.  Patient is hoping for some pain relief. Met with patient and family, requesting home hospice.  Pt made

## 2018-12-16 NOTE — PROGRESS NOTES
we'll have to see if he is still a candidate for this. We will add him on for cancer conference. We will continue his current therapy in the meantime.